# Patient Record
Sex: MALE | Race: WHITE | NOT HISPANIC OR LATINO | URBAN - METROPOLITAN AREA
[De-identification: names, ages, dates, MRNs, and addresses within clinical notes are randomized per-mention and may not be internally consistent; named-entity substitution may affect disease eponyms.]

---

## 2018-03-23 ENCOUNTER — INPATIENT (INPATIENT)
Facility: HOSPITAL | Age: 83
LOS: 3 days | Discharge: HOME HEALTH | End: 2018-03-27
Attending: INTERNAL MEDICINE

## 2018-03-23 VITALS
DIASTOLIC BLOOD PRESSURE: 78 MMHG | SYSTOLIC BLOOD PRESSURE: 154 MMHG | OXYGEN SATURATION: 98 % | HEIGHT: 69 IN | RESPIRATION RATE: 17 BRPM | HEART RATE: 78 BPM | WEIGHT: 179.9 LBS | TEMPERATURE: 97 F

## 2018-03-23 DIAGNOSIS — Z90.79 ACQUIRED ABSENCE OF OTHER GENITAL ORGAN(S): Chronic | ICD-10-CM

## 2018-03-23 LAB
ALBUMIN SERPL ELPH-MCNC: 5.1 G/DL — SIGNIFICANT CHANGE UP (ref 3.5–5.2)
ALP SERPL-CCNC: 70 U/L — SIGNIFICANT CHANGE UP (ref 30–115)
ALT FLD-CCNC: 18 U/L — SIGNIFICANT CHANGE UP (ref 0–41)
ANION GAP SERPL CALC-SCNC: 16 MMOL/L — HIGH (ref 7–14)
AST SERPL-CCNC: 22 U/L — SIGNIFICANT CHANGE UP (ref 0–41)
BILIRUB SERPL-MCNC: 0.9 MG/DL — SIGNIFICANT CHANGE UP (ref 0.2–1.2)
BUN SERPL-MCNC: 45 MG/DL — HIGH (ref 10–20)
CALCIUM SERPL-MCNC: 10.1 MG/DL — SIGNIFICANT CHANGE UP (ref 8.5–10.1)
CHLORIDE SERPL-SCNC: 98 MMOL/L — SIGNIFICANT CHANGE UP (ref 98–110)
CO2 SERPL-SCNC: 31 MMOL/L — SIGNIFICANT CHANGE UP (ref 17–32)
CREAT SERPL-MCNC: 1.5 MG/DL — SIGNIFICANT CHANGE UP (ref 0.7–1.5)
GLUCOSE SERPL-MCNC: 123 MG/DL — HIGH (ref 70–99)
HCT VFR BLD CALC: 49.1 % — SIGNIFICANT CHANGE UP (ref 42–52)
HGB BLD-MCNC: 16.1 G/DL — SIGNIFICANT CHANGE UP (ref 14–18)
MCHC RBC-ENTMCNC: 28.5 PG — SIGNIFICANT CHANGE UP (ref 27–31)
MCHC RBC-ENTMCNC: 32.8 G/DL — SIGNIFICANT CHANGE UP (ref 32–37)
MCV RBC AUTO: 86.9 FL — SIGNIFICANT CHANGE UP (ref 80–94)
NRBC # BLD: 0 /100 WBCS — SIGNIFICANT CHANGE UP (ref 0–0)
PLATELET # BLD AUTO: 202 K/UL — SIGNIFICANT CHANGE UP (ref 130–400)
POTASSIUM SERPL-MCNC: 3.7 MMOL/L — SIGNIFICANT CHANGE UP (ref 3.5–5)
POTASSIUM SERPL-SCNC: 3.7 MMOL/L — SIGNIFICANT CHANGE UP (ref 3.5–5)
PROT SERPL-MCNC: 7.7 G/DL — SIGNIFICANT CHANGE UP (ref 6–8)
RBC # BLD: 5.65 M/UL — SIGNIFICANT CHANGE UP (ref 4.7–6.1)
RBC # FLD: 13.3 % — SIGNIFICANT CHANGE UP (ref 11.5–14.5)
SODIUM SERPL-SCNC: 145 MMOL/L — SIGNIFICANT CHANGE UP (ref 135–146)
WBC # BLD: 8.24 K/UL — SIGNIFICANT CHANGE UP (ref 4.8–10.8)
WBC # FLD AUTO: 8.24 K/UL — SIGNIFICANT CHANGE UP (ref 4.8–10.8)

## 2018-03-23 RX ORDER — SODIUM CHLORIDE 9 MG/ML
1000 INJECTION INTRAMUSCULAR; INTRAVENOUS; SUBCUTANEOUS ONCE
Qty: 0 | Refills: 0 | Status: DISCONTINUED | OUTPATIENT
Start: 2018-03-23 | End: 2018-03-27

## 2018-03-23 NOTE — ED PROVIDER NOTE - PROGRESS NOTE DETAILS
Ochoa placed, gross blood is coming out. Ochoa flushed, but clotted quickly. Called urology for 3 way ochoa. Urology will evaluate pt. Urology at bedside, placing 3-way ochoa. Recommend admission. Spoke with Dr. Mattson, notified of admission. Urology requests CT A/P and they will follow results. Dr. Mattson notified of order.

## 2018-03-23 NOTE — ED PROVIDER NOTE - CARE PLAN
Principal Discharge DX:	Urinary retention due to benign prostatic hyperplasia  Secondary Diagnosis:	Gross hematuria

## 2018-03-23 NOTE — ED PROVIDER NOTE - PHYSICAL EXAMINATION
pt in NAD, AAOx3, head NC/AT, CN II-XII intact, lungs CTA B/L, CV S1S2 regular, abdomen soft/(+) suprapubic distension and discomfort/(+)BS, ext (-) edema.

## 2018-03-23 NOTE — ED PROVIDER NOTE - OBJECTIVE STATEMENT
87 y.o. male comes in c/o urinary retention since am. No fever/chills. No CP/SOB. No abdominal pain. No back pain. Pt states he has an urge to urinate but nothing is coming out. H/o BPH. Had TURP 8 years ago. No problems since that time.

## 2018-03-23 NOTE — ED PROVIDER NOTE - NS ED ROS FT
pt in NAD, AAOx3, head NC/AT, CN II-XII intact, lungs CTA B/L, CV S1S2 regular, abdomen soft/NT/(+) suprapubic fullness/(+)BS, ext (-) edema. motor 5/5x4

## 2018-03-24 DIAGNOSIS — N40.1 BENIGN PROSTATIC HYPERPLASIA WITH LOWER URINARY TRACT SYMPTOMS: ICD-10-CM

## 2018-03-24 DIAGNOSIS — N40.0 BENIGN PROSTATIC HYPERPLASIA WITHOUT LOWER URINARY TRACT SYMPTOMS: ICD-10-CM

## 2018-03-24 DIAGNOSIS — R31.0 GROSS HEMATURIA: ICD-10-CM

## 2018-03-24 DIAGNOSIS — I10 ESSENTIAL (PRIMARY) HYPERTENSION: ICD-10-CM

## 2018-03-24 LAB
ANION GAP SERPL CALC-SCNC: 17 MMOL/L — HIGH (ref 7–14)
BUN SERPL-MCNC: 55 MG/DL — HIGH (ref 10–20)
CALCIUM SERPL-MCNC: 9.4 MG/DL — SIGNIFICANT CHANGE UP (ref 8.5–10.1)
CHLORIDE SERPL-SCNC: 100 MMOL/L — SIGNIFICANT CHANGE UP (ref 98–110)
CO2 SERPL-SCNC: 28 MMOL/L — SIGNIFICANT CHANGE UP (ref 17–32)
CREAT SERPL-MCNC: 2.1 MG/DL — HIGH (ref 0.7–1.5)
GLUCOSE SERPL-MCNC: 123 MG/DL — HIGH (ref 70–99)
HCT VFR BLD CALC: 43.1 % — SIGNIFICANT CHANGE UP (ref 42–52)
HGB BLD-MCNC: 14.2 G/DL — SIGNIFICANT CHANGE UP (ref 14–18)
INR BLD: 1.18 RATIO — SIGNIFICANT CHANGE UP (ref 0.65–1.3)
MCHC RBC-ENTMCNC: 28.3 PG — SIGNIFICANT CHANGE UP (ref 27–31)
MCHC RBC-ENTMCNC: 32.9 G/DL — SIGNIFICANT CHANGE UP (ref 32–37)
MCV RBC AUTO: 86 FL — SIGNIFICANT CHANGE UP (ref 80–94)
NRBC # BLD: 0 /100 WBCS — SIGNIFICANT CHANGE UP (ref 0–0)
PLATELET # BLD AUTO: 177 K/UL — SIGNIFICANT CHANGE UP (ref 130–400)
POTASSIUM SERPL-MCNC: 3.8 MMOL/L — SIGNIFICANT CHANGE UP (ref 3.5–5)
POTASSIUM SERPL-SCNC: 3.8 MMOL/L — SIGNIFICANT CHANGE UP (ref 3.5–5)
PROTHROM AB SERPL-ACNC: 12.8 SEC — SIGNIFICANT CHANGE UP (ref 9.95–12.87)
RBC # BLD: 5.01 M/UL — SIGNIFICANT CHANGE UP (ref 4.7–6.1)
RBC # FLD: 13.6 % — SIGNIFICANT CHANGE UP (ref 11.5–14.5)
SODIUM SERPL-SCNC: 145 MMOL/L — SIGNIFICANT CHANGE UP (ref 135–146)
WBC # BLD: 10.01 K/UL — SIGNIFICANT CHANGE UP (ref 4.8–10.8)
WBC # FLD AUTO: 10.01 K/UL — SIGNIFICANT CHANGE UP (ref 4.8–10.8)

## 2018-03-24 RX ORDER — TAMSULOSIN HYDROCHLORIDE 0.4 MG/1
0.4 CAPSULE ORAL DAILY
Qty: 0 | Refills: 0 | Status: DISCONTINUED | OUTPATIENT
Start: 2018-03-24 | End: 2018-03-24

## 2018-03-24 RX ORDER — CIPROFLOXACIN LACTATE 400MG/40ML
200 VIAL (ML) INTRAVENOUS DAILY
Qty: 0 | Refills: 0 | Status: DISCONTINUED | OUTPATIENT
Start: 2018-03-24 | End: 2018-03-27

## 2018-03-24 RX ORDER — ONDANSETRON 8 MG/1
4 TABLET, FILM COATED ORAL ONCE
Qty: 0 | Refills: 0 | Status: DISCONTINUED | OUTPATIENT
Start: 2018-03-24 | End: 2018-03-25

## 2018-03-24 RX ORDER — ATENOLOL 25 MG/1
100 TABLET ORAL DAILY
Qty: 0 | Refills: 0 | Status: DISCONTINUED | OUTPATIENT
Start: 2018-03-24 | End: 2018-03-24

## 2018-03-24 RX ORDER — MORPHINE SULFATE 50 MG/1
2 CAPSULE, EXTENDED RELEASE ORAL
Qty: 0 | Refills: 0 | Status: DISCONTINUED | OUTPATIENT
Start: 2018-03-24 | End: 2018-03-25

## 2018-03-24 RX ORDER — ATENOLOL 25 MG/1
100 TABLET ORAL DAILY
Qty: 0 | Refills: 0 | Status: DISCONTINUED | OUTPATIENT
Start: 2018-03-24 | End: 2018-03-27

## 2018-03-24 RX ORDER — SODIUM CHLORIDE 9 MG/ML
1000 INJECTION INTRAMUSCULAR; INTRAVENOUS; SUBCUTANEOUS
Qty: 0 | Refills: 0 | Status: DISCONTINUED | OUTPATIENT
Start: 2018-03-24 | End: 2018-03-27

## 2018-03-24 RX ORDER — TAMSULOSIN HYDROCHLORIDE 0.4 MG/1
0.4 CAPSULE ORAL DAILY
Qty: 0 | Refills: 0 | Status: DISCONTINUED | OUTPATIENT
Start: 2018-03-24 | End: 2018-03-27

## 2018-03-24 RX ADMIN — SODIUM CHLORIDE 75 MILLILITER(S): 9 INJECTION INTRAMUSCULAR; INTRAVENOUS; SUBCUTANEOUS at 09:27

## 2018-03-24 RX ADMIN — ATENOLOL 100 MILLIGRAM(S): 25 TABLET ORAL at 06:41

## 2018-03-24 RX ADMIN — TAMSULOSIN HYDROCHLORIDE 0.4 MILLIGRAM(S): 0.4 CAPSULE ORAL at 14:42

## 2018-03-24 NOTE — H&P ADULT - PROBLEM SELECTOR PLAN 1
for now await CAT scan report with  followup-concern about mild kidney diysfunction (may ask renal to see) for now await CAT scan report with  [id number] about mild kidney dysfunction for now await CAT scan report with  [id number] about mild kidney dysfunction (may ask renal to see)

## 2018-03-24 NOTE — CHART NOTE - NSCHARTNOTEFT_GEN_A_CORE
Dr. Sheppard asked if I could order a Urine C&S from Suprapubic catheter tonight and start Cipro due to his recent his procedure.  Patient with elevated Creat > 2 , so recommended dose is Cipro 200 mg IV Q24H.   Follow AM Creat. level.

## 2018-03-24 NOTE — BRIEF OPERATIVE NOTE - PROCEDURE
<<-----Click on this checkbox to enter Procedure Cystoscopy  03/24/2018    Active  JAMI  Suprapubic tube placement  03/24/2018    Active  JAMI

## 2018-03-24 NOTE — CONSULT NOTE ADULT - ATTENDING COMMENTS
Mr. Camargo has urinary retention  I feel given the volume and trauma to his prostate that a suprapubic tube would be the safest and most effective manner in which to decompress him  I will also be performing cystoscopy to assess the etiology of the inability to urinate  full R+B explained  all options provided

## 2018-03-24 NOTE — CONSULT NOTE ADULT - SUBJECTIVE AND OBJECTIVE BOX
86 yo male presents to the er because he hasn't voided since midnight (24 hours ago). States he only has mild pain and no fevers. ER staff attempted ochoa placement but only had blood return into ochoa tube. Subsequently a 3-way ochoa was attempted without success. Per er discussion with urology on call, a CAT scan was ordered with results pending. Patient denies history of hematuria or use of blood thinners     Review of Systems:  Other Review of Systems: All other review of systems negative, except as noted in HPI	      Allergies and Intolerances:        Allergies:  	penicillin: Drug, Short breath    Home Medications:   * Patient Currently Takes Medications as of 23-Mar-2018 22:26 documented in Structured Notes  · 	atenolol 100 mg oral tablet: 1 tab(s) orally once a day, Last Dose Taken:    · 	hydroCHLOROthiazide 50 mg oral tablet: 1 tab(s) orally once a day, Last Dose Taken:    · 	Flomax 0.4 mg oral capsule: 1 cap(s) orally once a day, Last Dose Taken:    · 	potassium chloride: Last Dose Taken:      .    Patient History:    Past Medical History:  Enlarged prostate    Hypertension.     Past Surgical History:  S/P TURP.            Physical Exam:  · Constitutional	Well-developed, well nourished	  · Eyes	detailed exam	  · Eyes Comments	?strabismus	  · ENMT	No oral lesions; no gross abnormalities	  · Neck	No bruits; no thyromegaly or nodules	  · Breasts	No masses; no nipple discharge	  · Back	No deformity or limitation of movement	  · Cardiovascular	Regular rate & rhythm, normal S1, S2; no murmurs, gallops or rubs; no S3, S4	  · Gastrointestinal	Soft, non-tender, no hepatosplenomegaly, normal bowel sounds	  · Genitourinary	not examined	  · Rectal	not examined	  · Extremities	No cyanosis, clubbing or edema	  · Vascular	Equal and normal pulses (carotid, femoral, dorsalis pedis)	  · Neurological	Alert & oriented; no sensory, motor or coordination deficits, normal reflexes	  · Skin	No lesions; no rash	  · Lymph Nodes	not examined	  · Musculoskeletal	not examined	  · Psychiatric	Affect and characteristics of appearance, verbalizations, behaviors are appropriate	       Labs and Results:  Labs, Radiology, Cardiology, and Other Results: 16.1   8.24  )-----------( 202      ( 23 Mar 2018 22:48 )             49.1    03-23   145  |  98  |  45<H>  ----------------------------<  123<H>  3.7   |  31  |  1.5   Ca    10.1      23 Mar 2018 22:48   TPro  7.7  /  Alb  5.1  /  TBili  0.9  /  DBili  x   /  AST  22  /  ALT  18  /  AlkPhos  70  03-23           Lactate Trend     CAPILLARY BLOOD GLUCOSE	    Assessment and Plan:    Problem/Plan - 1:  ·  Problem: Urinary retention due to benign prostatic hyperplasia.  FOR POSSIBLE SP TUBE THIS AM     Problem/Plan - 2:  ·  Problem: Gross hematuria.  Plan: as above, may be due to trauma.      Problem/Plan - 3:  ·  Problem: Enlarged prostate.  Plan: for now continue flomax while above issues are addressed.      Problem/Plan - 4:  ·  Problem: Hypertension, unspecified type.  Plan: continue 88 yo male presents to the er because he hasn't voided since midnight (24 hours ago). States he only has mild pain and no fevers. ER staff attempted ochoa placement but only had blood return into ochoa tube. Subsequently a 3-way ochoa was attempted without success. Patient denies history of hematuria or use of blood thinners     Review of Systems:  Other Review of Systems: All other review of systems negative, except as noted in HPI	      Allergies and Intolerances:        Allergies:  	penicillin: Drug, Short breath    Home Medications:   * Patient Currently Takes Medications as of 23-Mar-2018 22:26 documented in Structured Notes  · 	atenolol 100 mg oral tablet: 1 tab(s) orally once a day, Last Dose Taken:    · 	hydroCHLOROthiazide 50 mg oral tablet: 1 tab(s) orally once a day, Last Dose Taken:    · 	Flomax 0.4 mg oral capsule: 1 cap(s) orally once a day, Last Dose Taken:    · 	potassium chloride: Last Dose Taken:      .    Patient History:    Past Medical History:  Enlarged prostate    Hypertension.     Past Surgical History:  S/P TURP.            Physical Exam:  · Constitutional	Well-developed, well nourished	  · Eyes	detailed exam	  · Eyes Comments	?strabismus	  · ENMT	No oral lesions; no gross abnormalities	  · Neck	No bruits; no thyromegaly or nodules	  · Breasts	No masses; no nipple discharge	  · Back	No deformity or limitation of movement	  · Cardiovascular	Regular rate & rhythm, normal S1, S2; no murmurs, gallops or rubs; no S3, S4	  · Gastrointestinal	Soft, non-tender, no hepatosplenomegaly, normal bowel sounds	  · Genitourinary	circ male  no lesions 	  · Rectal	not examined	  · Extremities	No cyanosis, clubbing or edema	  · Vascular	Equal and normal pulses (carotid, femoral, dorsalis pedis)	  · Neurological	Alert & oriented; no sensory, motor or coordination deficits, normal reflexes	  · Skin	No lesions; no rash	  · Lymph Nodes	not examined	  · Musculoskeletal	not examined	  · Psychiatric	Affect and characteristics of appearance, verbalizations, behaviors are appropriate	       Labs and Results:  Labs, Radiology, Cardiology, and Other Results: 16.1   8.24  )-----------( 202      ( 23 Mar 2018 22:48 )             49.1    03-23   145  |  98  |  45<H>  ----------------------------<  123<H>  3.7   |  31  |  1.5   Ca    10.1      23 Mar 2018 22:48   TPro  7.7  /  Alb  5.1  /  TBili  0.9  /  DBili  x   /  AST  22  /  ALT  18  /  AlkPhos  70  03-23           Lactate Trend ct a&p   pending    CAPILLARY BLOOD GLUCOSE	    Assessment and Plan:    Problem/Plan - 1:  ·  Problem: Urinary retention due to benign prostatic hyperplasia.  FOR POSSIBLE SP TUBE THIS AM     Problem/Plan - 2:  ·  Problem: Gross hematuria.  Plan: as above, may be due to trauma.      Problem/Plan - 3:  ·  Problem: Enlarged prostate.  Plan: for now continue flomax while above issues are addressed.      Problem/Plan - 4:  ·  Problem: Hypertension, unspecified type.  Plan: continue

## 2018-03-24 NOTE — H&P ADULT - NSHPLABSRESULTS_GEN_ALL_CORE
16.1   8.24  )-----------( 202      ( 23 Mar 2018 22:48 )             49.1     03-23    145  |  98  |  45<H>  ----------------------------<  123<H>  3.7   |  31  |  1.5    Ca    10.1      23 Mar 2018 22:48    TPro  7.7  /  Alb  5.1  /  TBili  0.9  /  DBili  x   /  AST  22  /  ALT  18  /  AlkPhos  70  03-23              Lactate Trend        CAPILLARY BLOOD GLUCOSE

## 2018-03-24 NOTE — H&P ADULT - HISTORY OF PRESENT ILLNESS
88 yo male presents to the er because he hasn't voided since midnight (24 hours ago) ER staff attempted ochoa placement but only had blood return into ochoa tube. Subsequently a 3-way ochoa was attempted without success. Per er discussion with urology on call, a CAT scan was ordered with results pending. Patient denies history of hematuria or use of blood thinners 88 yo male presents to the er because he hasn't voided since midnight (24 hours ago). States he only has mild pain and no fevers. ER staff attempted ochoa placement but only had blood return into ochoa tube. Subsequently a 3-way ochoa was attempted without success. Per er discussion with urology on call, a CAT scan was ordered with results pending. Patient denies history of hematuria or use of blood thinners

## 2018-03-25 DIAGNOSIS — E87.6 HYPOKALEMIA: ICD-10-CM

## 2018-03-25 LAB
ANION GAP SERPL CALC-SCNC: 14 MMOL/L — SIGNIFICANT CHANGE UP (ref 7–14)
BUN SERPL-MCNC: 52 MG/DL — HIGH (ref 10–20)
CALCIUM SERPL-MCNC: 8.9 MG/DL — SIGNIFICANT CHANGE UP (ref 8.5–10.1)
CHLORIDE SERPL-SCNC: 100 MMOL/L — SIGNIFICANT CHANGE UP (ref 98–110)
CO2 SERPL-SCNC: 30 MMOL/L — SIGNIFICANT CHANGE UP (ref 17–32)
CREAT SERPL-MCNC: 1.8 MG/DL — HIGH (ref 0.7–1.5)
GLUCOSE SERPL-MCNC: 89 MG/DL — SIGNIFICANT CHANGE UP (ref 70–99)
HCT VFR BLD CALC: 39.6 % — LOW (ref 42–52)
HGB BLD-MCNC: 13.1 G/DL — LOW (ref 14–18)
INR BLD: 1.21 RATIO — SIGNIFICANT CHANGE UP (ref 0.65–1.3)
MCHC RBC-ENTMCNC: 28.9 PG — SIGNIFICANT CHANGE UP (ref 27–31)
MCHC RBC-ENTMCNC: 33.1 G/DL — SIGNIFICANT CHANGE UP (ref 32–37)
MCV RBC AUTO: 87.2 FL — SIGNIFICANT CHANGE UP (ref 80–94)
NRBC # BLD: 0 /100 WBCS — SIGNIFICANT CHANGE UP (ref 0–0)
PLATELET # BLD AUTO: 156 K/UL — SIGNIFICANT CHANGE UP (ref 130–400)
POTASSIUM SERPL-MCNC: 2.9 MMOL/L — LOW (ref 3.5–5)
POTASSIUM SERPL-SCNC: 2.9 MMOL/L — LOW (ref 3.5–5)
PROTHROM AB SERPL-ACNC: 13.2 SEC — HIGH (ref 9.95–12.87)
RBC # BLD: 4.54 M/UL — LOW (ref 4.7–6.1)
RBC # FLD: 13.8 % — SIGNIFICANT CHANGE UP (ref 11.5–14.5)
SODIUM SERPL-SCNC: 144 MMOL/L — SIGNIFICANT CHANGE UP (ref 135–146)
WBC # BLD: 8.31 K/UL — SIGNIFICANT CHANGE UP (ref 4.8–10.8)
WBC # FLD AUTO: 8.31 K/UL — SIGNIFICANT CHANGE UP (ref 4.8–10.8)

## 2018-03-25 RX ORDER — POTASSIUM CHLORIDE 20 MEQ
40 PACKET (EA) ORAL EVERY 4 HOURS
Qty: 0 | Refills: 0 | Status: COMPLETED | OUTPATIENT
Start: 2018-03-25 | End: 2018-03-25

## 2018-03-25 RX ADMIN — Medication 40 MILLIEQUIVALENT(S): at 10:48

## 2018-03-25 RX ADMIN — TAMSULOSIN HYDROCHLORIDE 0.4 MILLIGRAM(S): 0.4 CAPSULE ORAL at 11:39

## 2018-03-25 RX ADMIN — ATENOLOL 100 MILLIGRAM(S): 25 TABLET ORAL at 06:24

## 2018-03-25 RX ADMIN — Medication 100 MILLIGRAM(S): at 11:39

## 2018-03-25 NOTE — CONSULT NOTE ADULT - ASSESSMENT
87/M with GERRY , HTN, BPH admitted with urinary retention.    Chronic bladder obstruction - SPC placed, making a good amount of urine  - on Cipro    GERRY - improving  - no need for IVF. good po intake    Hypokalemia - being supplemented     f/u    will sign off, call as needed

## 2018-03-25 NOTE — CONSULT NOTE ADULT - SUBJECTIVE AND OBJECTIVE BOX
NEPHROLOGY CONSULTATION NOTE    Patient is a 87y Male whom presented to the hospital with inability to urinate, found to have urinary retention, b/l hydro. SPC placed yesterday.  Pt seen for GERRY. Good po intake, no chills, no vomiting.    PAST MEDICAL & SURGICAL HISTORY:  Enlarged prostate  Hypertension  S/P TURP    Allergies:  penicillin (Short breath)    Home Medications Reviewed  Hospital Medications:   MEDICATIONS  (STANDING):  ATENolol  Tablet 100 milliGRAM(s) Oral daily  ciprofloxacin   IVPB 200 milliGRAM(s) IV Intermittent daily  sodium chloride 0.9% Bolus 1000 milliLiter(s) IV Bolus once  sodium chloride 0.9%. 1000 milliLiter(s) (75 mL/Hr) IV Continuous <Continuous>  tamsulosin 0.4 milliGRAM(s) Oral daily      SOCIAL HISTORY:  Denies ETOH,Smoking,   FAMILY HISTORY:  No pertinent family history in first degree relatives        REVIEW OF SYSTEMS:  CONSTITUTIONAL: No weakness, fevers or chills  EYES/ENT: No visual changes;  No vertigo or throat pain   NECK: No pain or stiffness  RESPIRATORY: No cough, wheezing, hemoptysis; No shortness of breath  CARDIOVASCULAR: No chest pain or palpitations.  GASTROINTESTINAL: No abdominal or epigastric pain. No nausea, vomiting  GENITOURINARY: no suprapubic discomfort  SKIN: No itching, burning, rashes, or lesions   VASCULAR: No bilateral lower extremity edema.   All other review of systems is negative unless indicated above.    VITALS:  T(F): 97.6 (03-25-18 @ 06:00), Max: 98.7 (03-24-18 @ 09:12)  HR: 65 (03-25-18 @ 06:00)  BP: 122/59 (03-25-18 @ 06:00)  RR: 16 (03-24-18 @ 22:15)  SpO2: 92% (03-24-18 @ 10:22)    03-24 @ 07:01  -  03-25 @ 07:00  --------------------------------------------------------  IN: 855 mL / OUT: 1445 mL / NET: -590 mL          03-24-18 @ 07:01  -  03-25-18 @ 07:00  --------------------------------------------------------  IN: 0 mL / OUT: 215 mL / NET: -215 mL      I&O's Detail    24 Mar 2018 07:01  -  25 Mar 2018 07:00  --------------------------------------------------------  IN:    Oral Fluid: 520 mL    sodium chloride 0.9%.: 335 mL  Total IN: 855 mL    OUT:    Indwelling Catheter - Suprapubic: 1105 mL    Indwelling Catheter - Urethral: 215 mL    Voided: 125 mL  Total OUT: 1445 mL    Total NET: -590 mL            PHYSICAL EXAM:  Constitutional: NAD  HEENT: anicteric sclera, MMM  Neck: No JVD  Respiratory: CTAB, no wheezes, rales or rhonchi  Cardiovascular: S1, S2, RRR  Gastrointestinal: BS+, soft, NT/ND  Extremities: No cyanosis or clubbing. No peripheral edema  Neurological: A/O x 3, no focal deficits  Psychiatric: Normal mood, normal affect  : No CVA tenderness.  Gunn and SPC in place   Skin: No rashes  Vascular Access:    LABS:  03-25    144  |  100  |  52<H>  ----------------------------<  89  2.9<L>   |  30  |  1.8<H>    Ca    8.9      25 Mar 2018 06:30    TPro  7.7  /  Alb  5.1  /  TBili  0.9  /  DBili      /  AST  22  /  ALT  18  /  AlkPhos  70  03-23    Creatinine Trend: 1.8 <--, 2.1 <--, 1.5 <--                        13.1   8.31  )-----------( 156      ( 25 Mar 2018 06:30 )             39.6     Urine Studies:              RADIOLOGY & ADDITIONAL STUDIES:        < from: CT Abdomen and Pelvis No Cont (03.24.18 @ 02:00) >  KIDNEYS: There is mild bilateral hydroureteronephrosis, secondary to   chronic urinary bladder outlet obstruction.     < end of copied text >

## 2018-03-26 ENCOUNTER — TRANSCRIPTION ENCOUNTER (OUTPATIENT)
Age: 83
End: 2018-03-26

## 2018-03-26 LAB
ANION GAP SERPL CALC-SCNC: 14 MMOL/L — SIGNIFICANT CHANGE UP (ref 7–14)
BUN SERPL-MCNC: 45 MG/DL — HIGH (ref 10–20)
CALCIUM SERPL-MCNC: 8.8 MG/DL — SIGNIFICANT CHANGE UP (ref 8.5–10.1)
CHLORIDE SERPL-SCNC: 104 MMOL/L — SIGNIFICANT CHANGE UP (ref 98–110)
CO2 SERPL-SCNC: 29 MMOL/L — SIGNIFICANT CHANGE UP (ref 17–32)
CREAT SERPL-MCNC: 1.6 MG/DL — HIGH (ref 0.7–1.5)
GLUCOSE SERPL-MCNC: 89 MG/DL — SIGNIFICANT CHANGE UP (ref 70–99)
HCT VFR BLD CALC: 41.1 % — LOW (ref 42–52)
HGB BLD-MCNC: 13.3 G/DL — LOW (ref 14–18)
MAGNESIUM SERPL-MCNC: 2.1 MG/DL — SIGNIFICANT CHANGE UP (ref 1.8–2.4)
MCHC RBC-ENTMCNC: 28.4 PG — SIGNIFICANT CHANGE UP (ref 27–31)
MCHC RBC-ENTMCNC: 32.4 G/DL — SIGNIFICANT CHANGE UP (ref 32–37)
MCV RBC AUTO: 87.6 FL — SIGNIFICANT CHANGE UP (ref 80–94)
NRBC # BLD: 0 /100 WBCS — SIGNIFICANT CHANGE UP (ref 0–0)
PLATELET # BLD AUTO: 149 K/UL — SIGNIFICANT CHANGE UP (ref 130–400)
POTASSIUM SERPL-MCNC: 3.5 MMOL/L — SIGNIFICANT CHANGE UP (ref 3.5–5)
POTASSIUM SERPL-SCNC: 3.5 MMOL/L — SIGNIFICANT CHANGE UP (ref 3.5–5)
RBC # BLD: 4.69 M/UL — LOW (ref 4.7–6.1)
RBC # FLD: 13.5 % — SIGNIFICANT CHANGE UP (ref 11.5–14.5)
SODIUM SERPL-SCNC: 147 MMOL/L — HIGH (ref 135–146)
WBC # BLD: 7.83 K/UL — SIGNIFICANT CHANGE UP (ref 4.8–10.8)
WBC # FLD AUTO: 7.83 K/UL — SIGNIFICANT CHANGE UP (ref 4.8–10.8)

## 2018-03-26 RX ORDER — POTASSIUM CHLORIDE 20 MEQ
0 PACKET (EA) ORAL
Qty: 0 | Refills: 0 | COMMUNITY

## 2018-03-26 RX ORDER — MOXIFLOXACIN HYDROCHLORIDE TABLETS, 400 MG 400 MG/1
1 TABLET, FILM COATED ORAL
Qty: 20 | Refills: 0
Start: 2018-03-26 | End: 2018-04-04

## 2018-03-26 RX ADMIN — ATENOLOL 100 MILLIGRAM(S): 25 TABLET ORAL at 05:21

## 2018-03-26 RX ADMIN — TAMSULOSIN HYDROCHLORIDE 0.4 MILLIGRAM(S): 0.4 CAPSULE ORAL at 11:15

## 2018-03-26 RX ADMIN — Medication 100 MILLIGRAM(S): at 11:15

## 2018-03-26 NOTE — CHART NOTE - NSCHARTNOTEFT_GEN_A_CORE
Pt with suprapubic and ochoa catheter in place, both with pink tinged urine, no clots noted.  Case D/W Dr. Sheppard: will D/C ochoa catheter today; continue with suprapubic catheter for atleast 6 weeks, when patient will F/U in office                          13.3   7.83  )-----------( 149      ( 26 Mar 2018 07:43 )             41.1   03-26      147<H>  |  104  |  45<H>  ----------------------------<  89  3.5   |  29  |  1.6<H>    Ca    8.8      26 Mar 2018 07:43  Mg     2.1     03-26

## 2018-03-26 NOTE — DISCHARGE NOTE ADULT - PLAN OF CARE
resolution of symptoms continue meds, suprapubic catheter in place, follow up with PMD and with urology

## 2018-03-26 NOTE — CONSULT NOTE ADULT - ASSESSMENT
IMPRESSION: Rehab of 86 y/o   m  rehab  for  debility      PRECAUTIONS: [  ] Cardiac  [  ] Respiratory  [  ] Seizures [  ] Contact Isolation  [  ] Droplet Isolation  [x  ] Other fall ochoa    Weight Bearing Status:     RECOMMENDATION:    Out of Bed to Chair     DVT/Decubiti Prophylaxis    REHAB PLAN:     [ x  ] Bedside P/T 3-5 times a week   [x   ]   Bedside O/T  2-3 times a week             [  x ] No Rehab Therapy Indicated                   [   ]  Speech Therapy   Conditioning/ROM                                    ADL  Bed Mobility                                               Conditioning/ROM  Transfers                                                     Bed Mobility  Sitting /Standing Balance                         Transfers                                        Gait Training                                               Sitting/Standing Balance  Stair Training [   ]Applicable                    Home equipment Eval                                                                        Splinting  [   ] Only      GOALS:   ADL   [  x ]   Independent                    Transfers  [ x  ] Independent                          Ambulation  [x ] Independent     [  x  ] With device                            [   ]  CG                                                         [   ]  CG                                                                  [   ] CG                            [    ] Min A                                                   [   ] Min A                                                              [   ] Min  A          DISCHARGE PLAN:   [   ]  Good candidate for Intensive Rehabilitation/Hospital based-4A [facility]                                             Will tolerate 3hrs Intensive Rehab Daily                                       [   xx ]  Short Term Rehab in Skilled Nursing Facility ptn refused  however  not safe  d/c  home                                       [    ]  Home with Outpatient or VN services                                         [    ]  Possible Candidate for Intensive Hospital based Rehab

## 2018-03-26 NOTE — PHYSICAL THERAPY INITIAL EVALUATION ADULT - IMPAIRMENTS FOUND, PT EVAL
posture/ergonomics and body mechanics/aerobic capacity/endurance/gait, locomotion, and balance/muscle strength

## 2018-03-26 NOTE — DISCHARGE NOTE ADULT - CARE PROVIDER_API CALL
Anabel Sheppard), Urology  [address] [place] [address]  [address] [place] [address]  Phone: [phone]  Fax: [phone]

## 2018-03-26 NOTE — DISCHARGE NOTE ADULT - HOSPITAL COURSE
pt admitted with urine retention, s/p suprapubic catheter in place by urology, draining urine. no complications, pt has no complaints. continue current meds, follow up with PMD and with urology Dr. Sheppard.

## 2018-03-26 NOTE — DISCHARGE NOTE ADULT - PATIENT PORTAL LINK FT
You can access the [facility] Patient Portal, offered by [facility], by registering with the following website: [email or web address]

## 2018-03-26 NOTE — DISCHARGE NOTE ADULT - MEDICATION SUMMARY - MEDICATIONS TO TAKE
I will START or STAY ON the medications listed below when I get home from the hospital:    Flomax 0.4 mg oral capsule  -- 1 cap(s) by mouth once a day  -- Indication: For Enlarged prostate    atenolol 100 mg oral tablet  -- 1 tab(s) by mouth once a day  -- Indication: For Hypertension    Cipro 500 mg oral tablet  -- 1 tab(s) by mouth every 12 hours   -- Avoid prolonged or excessive exposure to direct and/or artificial sunlight while taking this medication.  Check with your doctor before becoming pregnant.  Do not take dairy products, antacids, or iron preparations within one hour of this medication.  Finish all this medication unless otherwise directed by prescriber.  Medication should be taken with plenty of water.    -- Indication: For Gross hematuria/uti

## 2018-03-26 NOTE — PHYSICAL THERAPY INITIAL EVALUATION ADULT - PLANNED THERAPY INTERVENTIONS, PT EVAL
balance training/bed mobility training/transfer training/gait training/strengthening/postural re-education

## 2018-03-26 NOTE — CONSULT NOTE ADULT - SUBJECTIVE AND OBJECTIVE BOX
HPI:  88 yo male presents to the er because he hasn't voided since midnight (24 hours ago). States he only has mild pain and no fevers. ER staff attempted ochoa placement but only had blood return into ochoa tube. Subsequently a 3-way ochoa was attempted without success. Per er discussion with urology on call, a CAT scan was ordered with results pending. Patient denies history of hematuria or use of blood thinners (24 Mar 2018 01:54)    PTN  REFERRED TO ACUTE  REHAB  FOR  EVAL AND  TX   PAST MEDICAL & SURGICAL HISTORY:  Enlarged prostate  Hypertension  S/P TURP      Hospital Course:    TODAY'S SUBJECTIVE & REVIEW OF SYMPTOMS:     Constitutional WNL   Cardio WNL   Resp WNL   GI WNL  Heme WNL  Endo WNL  Skin WNL  MSK WNL  Neuro WNL  Cognitive WNL  Psych WNL      MEDICATIONS  (STANDING):  ATENolol  Tablet 100 milliGRAM(s) Oral daily  ciprofloxacin   IVPB 200 milliGRAM(s) IV Intermittent daily  sodium chloride 0.9% Bolus 1000 milliLiter(s) IV Bolus once  sodium chloride 0.9%. 1000 milliLiter(s) (75 mL/Hr) IV Continuous <Continuous>  tamsulosin 0.4 milliGRAM(s) Oral daily    MEDICATIONS  (PRN):      FAMILY HISTORY:  No pertinent family history in first degree relatives      Allergies    penicillin (Short breath)    Intolerances        SOCIAL HISTORY:    [  ] Etoh  [  ] Smoking  [  ] Substance abuse     Home Environment:  [  ] Home Alone  [xx  ] Lives with Family  [  ] Home Health Aid    Dwelling:  [  ] Apartment  [ x ] Private House  [  ] Adult Home  [  ] Skilled Nursing Facility      [  ] Short Term  [  ] Long Term  [ x ] Stairs       Elevator [  ]    FUNCTIONAL STATUS PTA: (Check all that apply)  Ambulation: [ x  ]Independent    [  ] Dependent     [  ] Non-Ambulatory  Assistive Device: [  ] SA Cane  [  ]  Q Cane  [ x ] Walker  [  ]  Wheelchair  ADL : [ x ] Independent  [  ]  Dependent       Vital Signs Last 24 Hrs  T(C): 36.1 (26 Mar 2018 14:07), Max: 37.5 (25 Mar 2018 22:54)  T(F): 96.9 (26 Mar 2018 14:07), Max: 99.5 (25 Mar 2018 22:54)  HR: 59 (26 Mar 2018 14:07) (57 - 70)  BP: 138/75 (26 Mar 2018 14:07) (133/59 - 138/75)  BP(mean): --  RR: 16 (26 Mar 2018 14:07) (14 - 16)  SpO2: --      PHYSICAL EXAM: Alert & Oriented X3  GENERAL: NAD, well-groomed, well-developed  HEAD:  Atraumatic, Normocephalic  EYES: EOMI, PERRLA, conjunctiva and sclera clear  NECK: Supple, No JVD, Normal thyroid  CHEST/LUNG: Clear to percussion bilaterally; No rales, rhonchi, wheezing, or rubs  HEART: Regular rate and rhythm; No murmurs, rubs, or gallops  ABDOMEN: Soft, Nontender, Nondistended; Bowel sounds present  EXTREMITIES:  2+ Peripheral Pulses, No clubbing, cyanosis, or edema    NERVOUS SYSTEM:  Cranial Nerves 2-12 intact [ x ] Abnormal  [  ]  ROM: WFL all extremities [ x ]  Abnormal [  ]  Motor Strength: WFL all extremities  [x  ]  Abnormal [  ]  Sensation: intact to light touch [x ] Abnormal [  ]  Reflexes: Symmetric [ x ]  Abnormal [  ]    FUNCTIONAL STATUS:  Bed Mobility: Independent [  ]  Supervision [  ]  Needs Assistance [ x]  N/A [  ]  Transfers: Independent [  ]  Supervision [  ]  Needs Assistance [ x ]  N/A [  ]   Ambulation: Independent [  ]  Supervision [  ]  Needs Assistance [ x ]  N/A [  ]  ADL: Independent [ x ] Requires Assistance [  ] N/A [  ]      LABS:                        13.3   7.83  )-----------( 149      ( 26 Mar 2018 07:43 )             41.1     03-26    147<H>  |  104  |  45<H>  ----------------------------<  89  3.5   |  29  |  1.6<H>    Ca    8.8      26 Mar 2018 07:43  Mg     2.1     03-26      PT/INR - ( 25 Mar 2018 06:30 )   PT: 13.20 sec;   INR: 1.21 ratio               RADIOLOGY & ADDITIONAL STUDIES:    Assesment:

## 2018-03-26 NOTE — PHYSICAL THERAPY INITIAL EVALUATION ADULT - GENERAL OBSERVATIONS, REHAB EVAL
14:55 - 15:13.  Chart reviewed. Patient available to be seen for physical therapy, confirmed with nurse. Patient encountered semi-reclined in bed. Denies pain at rest and is agreeable for therapy.

## 2018-03-26 NOTE — DISCHARGE NOTE ADULT - MEDICATION SUMMARY - MEDICATIONS TO STOP TAKING
I will STOP taking the medications listed below when I get home from the hospital:    hydroCHLOROthiazide 50 mg oral tablet  -- 1 tab(s) by mouth once a day

## 2018-03-26 NOTE — DISCHARGE NOTE ADULT - CARE PLAN
Principal Discharge DX:	Urinary retention due to benign prostatic hyperplasia  Goal:	resolution of symptoms  Assessment and plan of treatment:	continue meds, suprapubic catheter in place, follow up with PMD and with urology

## 2018-03-27 VITALS
RESPIRATION RATE: 16 BRPM | DIASTOLIC BLOOD PRESSURE: 89 MMHG | TEMPERATURE: 98 F | HEART RATE: 62 BPM | SYSTOLIC BLOOD PRESSURE: 119 MMHG

## 2018-03-27 RX ADMIN — ATENOLOL 100 MILLIGRAM(S): 25 TABLET ORAL at 06:22

## 2018-03-27 RX ADMIN — TAMSULOSIN HYDROCHLORIDE 0.4 MILLIGRAM(S): 0.4 CAPSULE ORAL at 13:12

## 2018-03-27 RX ADMIN — Medication 100 MILLIGRAM(S): at 13:12

## 2018-03-27 NOTE — PROGRESS NOTE ADULT - PROBLEM SELECTOR PLAN 2
improved  draining pink/clear urine
improved  draining pink/clear urine
may be secondary to ochoa insertion  BPH

## 2018-03-27 NOTE — PROGRESS NOTE ADULT - PROBLEM SELECTOR PROBLEM 1
Urinary retention due to benign prostatic hyperplasia

## 2018-03-27 NOTE — PROGRESS NOTE ADULT - ASSESSMENT
Hypokalemia: Hypokalemia  Hypertension, unspecified type: Hypertension, unspecified type  Enlarged prostate: Enlarged prostate  Gross hematuria: Gross hematuria  Urinary retention due to benign prostatic hyperplasia: Urinary retention due to benign prostatic hyperplasia
Hypertension, unspecified type: Hypertension, unspecified type  Enlarged prostate: Enlarged prostate  Gross hematuria: Gross hematuria  Urinary retention due to benign prostatic hyperplasia: Urinary retention due to benign prostatic hyperplasia
Hypokalemia: Hypokalemia  Hypertension, unspecified type: Hypertension, unspecified type  Enlarged prostate: Enlarged prostate  Gross hematuria: Gross hematuria  Urinary retention due to benign prostatic hyperplasia: Urinary retention due to benign prostatic hyperplasia
Impression:  s/p Placement of suprapubic catheter and Villa catheter due to urinary retention from BPH (POD #1).
Impression:  s/p Placement of suprapubic catheter for BPH with urinary retention today.

## 2018-03-27 NOTE — PROGRESS NOTE ADULT - SUBJECTIVE AND OBJECTIVE BOX
JERRI DORSEYMALCOLM  87y  Male      Patient is a 87y old  Male who presents with a chief complaint of urinary retention (24 Mar 2018 01:54)      INTERVAL HPI/OVERNIGHT EVENTS:  none          T(C): 35.8 (03-26-18 @ 05:33), Max: 37.5 (03-25-18 @ 22:54)  HR: 57 (03-26-18 @ 05:33) (57 - 70)  BP: 133/59 (03-26-18 @ 05:33) (133/59 - 180/72)  RR: 14 (03-26-18 @ 05:33) (14 - 16)  SpO2: --  Wt(kg): --  Vital Signs Last 24 Hrs  T(C): 35.8 (26 Mar 2018 05:33), Max: 37.5 (25 Mar 2018 22:54)  T(F): 96.5 (26 Mar 2018 05:33), Max: 99.5 (25 Mar 2018 22:54)  HR: 57 (26 Mar 2018 05:33) (57 - 70)  BP: 133/59 (26 Mar 2018 05:33) (133/59 - 180/72)  BP(mean): --  RR: 14 (26 Mar 2018 05:33) (14 - 16)  SpO2: --    PHYSICAL EXAM:  GENERAL: NAD   HEAD:  Atraumatic, Normocephalic  CHEST/LUNG: Clear to auscultation   HEART: S1/S2  ABDOMEN: Soft, suprapubic catheter in place, draining urine  EXTREMITIES:  no edema    LABS:                          13.3   7.83  )-----------( 149      ( 26 Mar 2018 07:43 )             41.1     03-25    144  |  100  |  52<H>  ----------------------------<  89  2.9<L>   |  30  |  1.8<H>    Ca    8.9      25 Mar 2018 06:30          MEDICATIONS  (STANDING):  ATENolol  Tablet 100 milliGRAM(s) Oral daily  ciprofloxacin   IVPB 200 milliGRAM(s) IV Intermittent daily  sodium chloride 0.9% Bolus 1000 milliLiter(s) IV Bolus once  sodium chloride 0.9%. 1000 milliLiter(s) (75 mL/Hr) IV Continuous <Continuous>  tamsulosin 0.4 milliGRAM(s) Oral daily    MEDICATIONS  (PRN):        Consultant(s) Notes Reviewed:  [x ] YES  [ ] NO  Care Discussed with Consultants/Other Providers [ x] YES  [ ] NO        RADIOLOGY & ADDITIONAL TESTS:      Imaging Personally Reviewed:  [ ] YES  [ ] NO    HEALTH ISSUES - PROBLEM Dx:  Hypokalemia: Hypokalemia  Hypertension, unspecified type: Hypertension, unspecified type  Enlarged prostate: Enlarged prostate  Gross hematuria: Gross hematuria  Urinary retention due to benign prostatic hyperplasia: Urinary retention due to benign prostatic hyperplasia          Case discussed with housestaff
DORSEY JERRIMALCOLM  87y  Male      Patient is a 87y old  Male who presents with a chief complaint of urinary retention (24 Mar 2018 01:54)      INTERVAL HPI/OVERNIGHT EVENTS:  s/p suprapubic cath placed by urology        T(C): 36.4 (03-25-18 @ 06:00), Max: 37.1 (03-24-18 @ 09:12)  HR: 65 (03-25-18 @ 06:00) (55 - 68)  BP: 122/59 (03-25-18 @ 06:00) (102/53 - 130/62)  RR: 16 (03-24-18 @ 22:15) (16 - 18)  SpO2: 92% (03-24-18 @ 10:22) (92% - 97%)  Wt(kg): --  Vital Signs Last 24 Hrs  T(C): 36.4 (25 Mar 2018 06:00), Max: 37.1 (24 Mar 2018 09:12)  T(F): 97.6 (25 Mar 2018 06:00), Max: 98.7 (24 Mar 2018 09:12)  HR: 65 (25 Mar 2018 06:00) (55 - 68)  BP: 122/59 (25 Mar 2018 06:00) (102/53 - 130/62)  BP(mean): --  RR: 16 (24 Mar 2018 22:15) (16 - 18)  SpO2: 92% (24 Mar 2018 10:22) (92% - 97%)    PHYSICAL EXAM:  GENERAL: NAD   HEAD:  Atraumatic, Normocephalic  CHEST/LUNG: Clear to auscultation bilaterally  HEART: S1/S2  ABDOMEN: Suprapubic catheter  EXTREMITIES:  no edema    LABS:                          13.1   8.31  )-----------( 156      ( 25 Mar 2018 06:30 )             39.6     03-25    144  |  100  |  52<H>  ----------------------------<  89  2.9<L>   |  30  |  1.8<H>    Ca    8.9      25 Mar 2018 06:30    TPro  7.7  /  Alb  5.1  /  TBili  0.9  /  DBili  x   /  AST  22  /  ALT  18  /  AlkPhos  70  03-23        MEDICATIONS  (STANDING):  ATENolol  Tablet 100 milliGRAM(s) Oral daily  ciprofloxacin   IVPB 200 milliGRAM(s) IV Intermittent daily  sodium chloride 0.9% Bolus 1000 milliLiter(s) IV Bolus once  sodium chloride 0.9%. 1000 milliLiter(s) (75 mL/Hr) IV Continuous <Continuous>  tamsulosin 0.4 milliGRAM(s) Oral daily    MEDICATIONS  (PRN):  morphine  - Injectable 2 milliGRAM(s) IV Push every 15 minutes PRN Severe Pain (7 - 10)  ondansetron Injectable 4 milliGRAM(s) IV Push once PRN Nausea and/or Vomiting        Consultant(s) Notes Reviewed:  [x ] YES  [ ] NO  Care Discussed with Consultants/Other Providers [ x] YES  [ ] NO        RADIOLOGY & ADDITIONAL TESTS:      Imaging Personally Reviewed:  [ ] YES  [ ] NO    HEALTH ISSUES - PROBLEM Dx:  Hypertension, unspecified type: Hypertension, unspecified type  Enlarged prostate: Enlarged prostate  Gross hematuria: Gross hematuria  Urinary retention due to benign prostatic hyperplasia: Urinary retention due to benign prostatic hyperplasia          Case discussed with housestaff
DORSEY JERRIMALCOLM  87y  Male      Patient is a 87y old  Male who presents with a chief complaint of urinary retention (26 Mar 2018 14:13)      INTERVAL HPI/OVERNIGHT EVENTS:  ochoa catheter discontinued        T(C): 36.4 (03-27-18 @ 06:00), Max: 36.4 (03-27-18 @ 06:00)  HR: 62 (03-27-18 @ 06:00) (59 - 62)  BP: 154/70 (03-27-18 @ 06:00) (119/55 - 154/70)  RR: 16 (03-27-18 @ 06:00) (16 - 16)  SpO2: --  Wt(kg): --  Vital Signs Last 24 Hrs  T(C): 36.4 (27 Mar 2018 06:00), Max: 36.4 (27 Mar 2018 06:00)  T(F): 97.5 (27 Mar 2018 06:00), Max: 97.5 (27 Mar 2018 06:00)  HR: 62 (27 Mar 2018 06:00) (59 - 62)  BP: 154/70 (27 Mar 2018 06:00) (119/55 - 154/70)  BP(mean): --  RR: 16 (27 Mar 2018 06:00) (16 - 16)  SpO2: --    PHYSICAL EXAM:  GENERAL: NAD   HEAD:  Atraumatic, Normocephalic  CHEST/LUNG: Clear to auscultation bilaterally  HEART: S1/S2  ABDOMEN: Soft, Nontender, suprapubic catheter   EXTREMITIES:  no edema,     LABS:                          13.3   7.83  )-----------( 149      ( 26 Mar 2018 07:43 )             41.1     03-26    147<H>  |  104  |  45<H>  ----------------------------<  89  3.5   |  29  |  1.6<H>    Ca    8.8      26 Mar 2018 07:43  Mg     2.1     03-26          MEDICATIONS  (STANDING):  ATENolol  Tablet 100 milliGRAM(s) Oral daily  ciprofloxacin   IVPB 200 milliGRAM(s) IV Intermittent daily  sodium chloride 0.9% Bolus 1000 milliLiter(s) IV Bolus once  sodium chloride 0.9%. 1000 milliLiter(s) (75 mL/Hr) IV Continuous <Continuous>  tamsulosin 0.4 milliGRAM(s) Oral daily    MEDICATIONS  (PRN):        Consultant(s) Notes Reviewed:  [x ] YES  [ ] NO  Care Discussed with Consultants/Other Providers [ x] YES  [ ] NO        RADIOLOGY & ADDITIONAL TESTS:      Imaging Personally Reviewed:  [ ] YES  [ ] NO    HEALTH ISSUES - PROBLEM Dx:  Hypokalemia: Hypokalemia  Hypertension, unspecified type: Hypertension, unspecified type  Enlarged prostate: Enlarged prostate  Gross hematuria: Gross hematuria  Urinary retention due to benign prostatic hyperplasia: Urinary retention due to benign prostatic hyperplasia          Case discussed with housestaff
POD #1 -- s/p Placement of suprapubic catheter and Ochoa catheter due to urinary retention from BPH.       Patient seen and examined and feels well.  Denies pain.  SP catheter and Ochoa catheter functioning well and urine is clearing and is very light pink.        Vital Signs Last 24 Hrs  T(C): 36.1 (25 Mar 2018 14:20), Max: 36.5 (24 Mar 2018 22:15)  T(F): 97 (25 Mar 2018 14:20), Max: 97.7 (24 Mar 2018 22:15)  HR: 66 (25 Mar 2018 14:20) (55 - 66)  BP: 180/72 (25 Mar 2018 14:20) (122/59 - 180/72)  BP(mean): --  RR: 16 (25 Mar 2018 14:20) (16 - 16)        Exam:   Abdomen:  SP cath. in place and functioning well with clearing hematuria, dressing is C/D/I, no bleeding,  no erythema, no tenderness,  no distention,  no peritoneal signs.  :  ochoa cath. in place and functioning well with clearing hematuria.          Labs:                        13.1   8.31  )-----------( 156      ( 25 Mar 2018 06:30 )             39.6         03-25    144  |  100  |  52<H>  ----------------------------<  89  2.9<L>   |  30  |  1.8<H>    Ca    8.9      25 Mar 2018 06:30    TPro  7.7  /  Alb  5.1  /  TBili  0.9  /  DBili  x   /  AST  22  /  ALT  18  /  AlkPhos  70  03-23
Post-op. note:  s/p Placement of suprapubic catheter for BPH with urinary retention today.      Patient seen and examined.    Reports mild incisional pain over suprapubic area, otherwise feels well.  Denies CP, SOB, dizziness, N/V.  Suprapubic catheter and Villa catheter in place with hematuria noted, no clots, good output.         Vital Signs Last 24 Hrs  T(C): 36.8 (24 Mar 2018 10:22), Max: 37.1 (24 Mar 2018 09:12)  T(F): 98.3 (24 Mar 2018 10:22), Max: 98.7 (24 Mar 2018 09:12)  HR: 56 (24 Mar 2018 10:22) (56 - 97)  BP: 102/58 (24 Mar 2018 10:22) (102/53 - 163/73)  BP(mean): --  RR: 16 (24 Mar 2018 09:52) (16 - 18)  SpO2: 92% (24 Mar 2018 10:22) (92% - 98%)          PHYSICAL EXAM:    General: Conversant in NAD.    Eyes: PERRLA, EOM intact.    Neck: no tenderness, no JVD.    Back: no spinal tenderness.     Respiratory: CTA B/L.    Cardiovascular:  S1 & S2, RRR.    Abdomen: + BS, soft, no distention, dressing over suprapubic area is C/D/I, no bleeding, mild incisional tenderness, no rebound or guarding or peritoneal signs.    :  Villa catheter in place, hematuria noted but no clots.    Extremities: Free painless ROM, no C/C/E, no calf tenderness.     Neurological: No focal deficits.           Labs:                            14.2   10.01 )-----------( 177      ( 24 Mar 2018 06:37 )             43.1       03-24    145  |  100  |  55<H>  ----------------------------<  123<H>  3.8   |  28  |  2.1<H>    Ca    9.4      24 Mar 2018 06:30    TPro  7.7  /  Alb  5.1  /  TBili  0.9  /  DBili  x   /  AST  22  /  ALT  18  /  AlkPhos  70  03-23
RN informs me that plan is for placement of a suprapubic catheter this am due to prostate induced obstruction
no results yet on CAT scan so I called radiology file room for stat reading. Meanwhile patient says he is comfortable and is able to sleep

## 2018-03-29 DIAGNOSIS — N40.1 BENIGN PROSTATIC HYPERPLASIA WITH LOWER URINARY TRACT SYMPTOMS: ICD-10-CM

## 2018-03-29 DIAGNOSIS — R31.0 GROSS HEMATURIA: ICD-10-CM

## 2018-03-29 DIAGNOSIS — N17.9 ACUTE KIDNEY FAILURE, UNSPECIFIED: ICD-10-CM

## 2018-03-29 DIAGNOSIS — R33.8 OTHER RETENTION OF URINE: ICD-10-CM

## 2018-03-29 DIAGNOSIS — N13.30 UNSPECIFIED HYDRONEPHROSIS: ICD-10-CM

## 2018-03-29 DIAGNOSIS — E87.6 HYPOKALEMIA: ICD-10-CM

## 2018-03-29 DIAGNOSIS — I10 ESSENTIAL (PRIMARY) HYPERTENSION: ICD-10-CM

## 2018-04-05 ENCOUNTER — APPOINTMENT (OUTPATIENT)
Dept: UROLOGY | Facility: CLINIC | Age: 83
End: 2018-04-05
Payer: MEDICARE

## 2018-04-05 DIAGNOSIS — Z78.9 OTHER SPECIFIED HEALTH STATUS: ICD-10-CM

## 2018-04-05 DIAGNOSIS — Z80.0 FAMILY HISTORY OF MALIGNANT NEOPLASM OF DIGESTIVE ORGANS: ICD-10-CM

## 2018-04-05 DIAGNOSIS — E87.6 HYPOKALEMIA: ICD-10-CM

## 2018-04-05 DIAGNOSIS — I10 ESSENTIAL (PRIMARY) HYPERTENSION: ICD-10-CM

## 2018-04-05 DIAGNOSIS — R33.8 OTHER RETENTION OF URINE: ICD-10-CM

## 2018-04-05 PROBLEM — Z00.00 ENCOUNTER FOR PREVENTIVE HEALTH EXAMINATION: Status: ACTIVE | Noted: 2018-04-05

## 2018-04-05 PROBLEM — N40.0 BENIGN PROSTATIC HYPERPLASIA WITHOUT LOWER URINARY TRACT SYMPTOMS: Chronic | Status: ACTIVE | Noted: 2018-03-23

## 2018-04-05 PROCEDURE — 51700 IRRIGATION OF BLADDER: CPT

## 2018-04-05 RX ORDER — TAMSULOSIN HYDROCHLORIDE 0.4 MG/1
0.4 CAPSULE ORAL
Qty: 90 | Refills: 0 | Status: ACTIVE | COMMUNITY
Start: 2018-01-02

## 2018-04-05 RX ORDER — ATENOLOL 100 MG/1
100 TABLET ORAL
Qty: 90 | Refills: 0 | Status: ACTIVE | COMMUNITY
Start: 2017-12-26

## 2018-04-05 RX ORDER — CIPROFLOXACIN HYDROCHLORIDE 500 MG/1
500 TABLET, FILM COATED ORAL
Qty: 20 | Refills: 0 | Status: ACTIVE | COMMUNITY
Start: 2018-03-26

## 2018-04-05 RX ORDER — HYDROCHLOROTHIAZIDE 50 MG/1
50 TABLET ORAL
Qty: 90 | Refills: 0 | Status: ACTIVE | COMMUNITY
Start: 2017-11-12

## 2018-04-05 RX ORDER — POTASSIUM CHLORIDE 750 MG/1
10 TABLET, EXTENDED RELEASE ORAL
Qty: 90 | Refills: 0 | Status: ACTIVE | COMMUNITY
Start: 2017-11-12

## 2018-04-05 NOTE — HISTORY OF PRESENT ILLNESS
[Urinary Retention] : urinary retention [FreeTextEntry1] : Mr. Camargo had a suprapubic tube placed by Dr. Sheppard on March 24, 2018 he was discharged home on March 27 and the call today saying that the suprapubic tube stopped draining. His son told me that there have been occasional clots it since early this morning there has been nothing draining from the tube. He was told to come in.

## 2018-04-05 NOTE — LETTER BODY
[Dear  ___] : Dear  [unfilled], [Courtesy Letter:] : I had the pleasure of seeing your patient, [unfilled], in my office today. [Please see my note below.] : Please see my note below. [Sincerely,] : Sincerely, [FreeTextEntry2] : Uri Dolan MD\par [address] [facility]\[place] [address]

## 2018-04-05 NOTE — REVIEW OF SYSTEMS
[Feeling Poorly] : feeling poorly [Feeling Tired] : feeling tired [see HPI] : see HPI [Dizziness] : dizziness [Negative] : Psychiatric

## 2018-04-05 NOTE — ASSESSMENT
[FreeTextEntry1] : The bag has a fair amount of clot within it. When I disconnected the tube from the bank and a clean fashion there was no drainage. It was irrigated out and after about 150 mL of sterile water the clots stopped coming and then it started draining. I drained about three 400 mL of cloudy darkish red urine it looked like old blood. We then began to irrigate him out and after one and a half liters of irrigation the return was clear.\par \par I discussed with him and his son that for now looks okay. He could easily start the bleeding clot off-again those these looked more like old rather than fresh clots. We’re here tomorrow and then will be close to the weekend. \par

## 2018-04-05 NOTE — PHYSICAL EXAM
[Abdomen Soft] : soft [Abdomen Tenderness] : non-tender [Costovertebral Angle Tenderness] : no ~M costovertebral angle tenderness [Testes Tenderness] : no tenderness of the testes [Testes Mass (___cm)] : there were no testicular masses [] : no respiratory distress [Respiration, Rhythm And Depth] : normal respiratory rhythm and effort [Exaggerated Use Of Accessory Muscles For Inspiration] : no accessory muscle use [Oriented To Time, Place, And Person] : oriented to person, place, and time [Affect] : the affect was normal [Mood] : the mood was normal [Not Anxious] : not anxious [FreeTextEntry1] : difficulty walking

## 2018-04-05 NOTE — LETTER HEADER
[Pasquale Silveira MD] : Pasquale Silveira MD [Director of Urology] : Director of Urology [[address]] : [address] [[address]] : [address] [[place] [address]] : [place] [address] [Tel [phone]] : Tel [phone] [Fax [phone]] : Fax [phone]

## 2018-04-05 NOTE — END OF VISIT
[>50% of Time Spent on Counseling for ____] : Greater than 50% of the encounter time was spent on counseling for [unfilled]

## 2018-04-12 ENCOUNTER — APPOINTMENT (OUTPATIENT)
Dept: UROLOGY | Facility: CLINIC | Age: 83
End: 2018-04-12
Payer: MEDICARE

## 2018-04-12 VITALS — SYSTOLIC BLOOD PRESSURE: 155 MMHG | HEART RATE: 56 BPM | DIASTOLIC BLOOD PRESSURE: 64 MMHG

## 2018-04-12 PROCEDURE — 99213 OFFICE O/P EST LOW 20 MIN: CPT

## 2018-04-12 NOTE — ASSESSMENT
[FreeTextEntry1] : 88 yo with retention requiring SPT plct secondary to BPH\par \par - change spt in 1 month\par - start finasteride\par - will cap spt and give pt voiding trial in 1 month

## 2018-04-12 NOTE — PHYSICAL EXAM
[General Appearance - Well Developed] : well developed [General Appearance - Well Nourished] : well nourished [Normal Appearance] : normal appearance [Well Groomed] : well groomed [General Appearance - In No Acute Distress] : no acute distress [Abdomen Soft] : soft [Abdomen Tenderness] : non-tender [Costovertebral Angle Tenderness] : no ~M costovertebral angle tenderness [Edema] : no peripheral edema [] : no respiratory distress [Respiration, Rhythm And Depth] : normal respiratory rhythm and effort [Exaggerated Use Of Accessory Muscles For Inspiration] : no accessory muscle use [Oriented To Time, Place, And Person] : oriented to person, place, and time [Affect] : the affect was normal [Mood] : the mood was normal [Not Anxious] : not anxious [Normal Station and Gait] : the gait and station were normal for the patient's age [No Focal Deficits] : no focal deficits

## 2018-04-12 NOTE — HISTORY OF PRESENT ILLNESS
[None] : None [FreeTextEntry1] : OCTAVIO DORSEY is a 87 year old male with a past medical history of urinary retention. Presents to the office today for a follow up, last seen on 4/5/2018 by Dr. Silveira. Patient had  suprapubic tube placed on 3/24/2018 by Dr. Sheppard and patient was seen on 4/5/2018 for irrigation of suprapubic catheter due to no draining from clots. Patient currently has issues with suprapubic catheter at this time, draining clear yellow urine, no blood clots visible. Patient currently on Tamsulosin 0.4 mg daily. Son assisting patient with suprapubic catheter care as needed. No redness, drainage around suprapubic. No fever or chills. \par  [Fever] : no fever

## 2018-04-12 NOTE — LETTER BODY
[Dear  ___] : Dear  [unfilled], [Courtesy Letter:] : I had the pleasure of seeing your patient, [unfilled], in my office today. [Please see my note below.] : Please see my note below. [Sincerely,] : Sincerely, [FreeTextEntry2] : Dr. Uri Mejia

## 2018-04-12 NOTE — LETTER HEADER
[Ismael Sheppard MD] : Ismael Sheppard MD [Director of Urologic Oncology] : Director of Urologic Oncology [Cancer Services] : Cancer Services [Tel [phone]] : Tel [phone] [Fax [phone]] : Fax [phone]

## 2018-05-24 ENCOUNTER — APPOINTMENT (OUTPATIENT)
Dept: UROLOGY | Facility: CLINIC | Age: 83
End: 2018-05-24
Payer: MEDICARE

## 2018-05-24 VITALS
DIASTOLIC BLOOD PRESSURE: 84 MMHG | BODY MASS INDEX: 21.47 KG/M2 | SYSTOLIC BLOOD PRESSURE: 190 MMHG | WEIGHT: 150 LBS | HEIGHT: 70 IN | HEART RATE: 63 BPM

## 2018-05-24 PROCEDURE — 51705 CHANGE OF BLADDER TUBE: CPT

## 2018-07-05 ENCOUNTER — APPOINTMENT (OUTPATIENT)
Dept: UROLOGY | Facility: CLINIC | Age: 83
End: 2018-07-05
Payer: MEDICARE

## 2018-07-05 VITALS
WEIGHT: 150 LBS | HEART RATE: 60 BPM | BODY MASS INDEX: 21.47 KG/M2 | DIASTOLIC BLOOD PRESSURE: 69 MMHG | HEIGHT: 70 IN | SYSTOLIC BLOOD PRESSURE: 176 MMHG

## 2018-07-05 PROCEDURE — 51705 CHANGE OF BLADDER TUBE: CPT

## 2018-08-13 ENCOUNTER — APPOINTMENT (OUTPATIENT)
Dept: UROLOGY | Facility: CLINIC | Age: 83
End: 2018-08-13

## 2018-08-27 ENCOUNTER — APPOINTMENT (OUTPATIENT)
Dept: UROLOGY | Facility: CLINIC | Age: 83
End: 2018-08-27
Payer: MEDICARE

## 2018-08-27 VITALS
HEIGHT: 70 IN | BODY MASS INDEX: 21.47 KG/M2 | HEART RATE: 58 BPM | DIASTOLIC BLOOD PRESSURE: 83 MMHG | WEIGHT: 150 LBS | SYSTOLIC BLOOD PRESSURE: 191 MMHG

## 2018-08-27 PROCEDURE — 51705 CHANGE OF BLADDER TUBE: CPT

## 2018-09-06 ENCOUNTER — TRANSCRIPTION ENCOUNTER (OUTPATIENT)
Age: 83
End: 2018-09-06

## 2018-10-04 ENCOUNTER — APPOINTMENT (OUTPATIENT)
Dept: UROLOGY | Facility: CLINIC | Age: 83
End: 2018-10-04
Payer: MEDICARE

## 2018-10-04 VITALS
BODY MASS INDEX: 21.47 KG/M2 | HEIGHT: 70 IN | HEART RATE: 60 BPM | DIASTOLIC BLOOD PRESSURE: 77 MMHG | SYSTOLIC BLOOD PRESSURE: 189 MMHG | WEIGHT: 150 LBS

## 2018-10-04 PROCEDURE — 51705 CHANGE OF BLADDER TUBE: CPT

## 2018-11-12 ENCOUNTER — APPOINTMENT (OUTPATIENT)
Dept: UROLOGY | Facility: CLINIC | Age: 83
End: 2018-11-12
Payer: MEDICARE

## 2018-11-12 VITALS
WEIGHT: 142 LBS | SYSTOLIC BLOOD PRESSURE: 192 MMHG | DIASTOLIC BLOOD PRESSURE: 73 MMHG | BODY MASS INDEX: 20.33 KG/M2 | HEART RATE: 61 BPM | HEIGHT: 70 IN

## 2018-11-12 PROCEDURE — 51705 CHANGE OF BLADDER TUBE: CPT

## 2018-12-10 ENCOUNTER — APPOINTMENT (OUTPATIENT)
Dept: UROLOGY | Facility: CLINIC | Age: 83
End: 2018-12-10
Payer: MEDICARE

## 2018-12-10 VITALS
HEART RATE: 71 BPM | BODY MASS INDEX: 20.33 KG/M2 | WEIGHT: 142 LBS | HEIGHT: 70 IN | DIASTOLIC BLOOD PRESSURE: 73 MMHG | SYSTOLIC BLOOD PRESSURE: 204 MMHG

## 2018-12-10 PROCEDURE — 51705 CHANGE OF BLADDER TUBE: CPT

## 2019-01-14 ENCOUNTER — APPOINTMENT (OUTPATIENT)
Dept: UROLOGY | Facility: CLINIC | Age: 84
End: 2019-01-14
Payer: MEDICARE

## 2019-01-14 VITALS
SYSTOLIC BLOOD PRESSURE: 213 MMHG | HEIGHT: 70 IN | HEART RATE: 57 BPM | BODY MASS INDEX: 20.47 KG/M2 | WEIGHT: 143 LBS | DIASTOLIC BLOOD PRESSURE: 70 MMHG

## 2019-01-14 DIAGNOSIS — N40.1 BENIGN PROSTATIC HYPERPLASIA WITH LOWER URINARY TRACT SYMPMS: ICD-10-CM

## 2019-01-14 DIAGNOSIS — N40.0 BENIGN PROSTATIC HYPERPLASIA WITHOUT LOWER URINARY TRACT SYMPMS: ICD-10-CM

## 2019-01-14 DIAGNOSIS — N13.8 BENIGN PROSTATIC HYPERPLASIA WITH LOWER URINARY TRACT SYMPMS: ICD-10-CM

## 2019-01-14 PROCEDURE — 51705 CHANGE OF BLADDER TUBE: CPT

## 2019-02-14 ENCOUNTER — APPOINTMENT (OUTPATIENT)
Dept: UROLOGY | Facility: CLINIC | Age: 84
End: 2019-02-14
Payer: MEDICARE

## 2019-02-14 PROCEDURE — 51705 CHANGE OF BLADDER TUBE: CPT

## 2019-03-15 ENCOUNTER — TRANSCRIPTION ENCOUNTER (OUTPATIENT)
Age: 84
End: 2019-03-15

## 2019-03-21 ENCOUNTER — APPOINTMENT (OUTPATIENT)
Dept: UROLOGY | Facility: CLINIC | Age: 84
End: 2019-03-21
Payer: MEDICARE

## 2019-03-21 PROCEDURE — 51705 CHANGE OF BLADDER TUBE: CPT

## 2019-03-21 RX ORDER — FINASTERIDE 5 MG/1
5 TABLET, FILM COATED ORAL DAILY
Qty: 90 | Refills: 3 | Status: ACTIVE | COMMUNITY
Start: 2018-04-12 | End: 1900-01-01

## 2019-04-25 ENCOUNTER — APPOINTMENT (OUTPATIENT)
Dept: UROLOGY | Facility: CLINIC | Age: 84
End: 2019-04-25
Payer: MEDICARE

## 2019-04-25 PROCEDURE — 51705 CHANGE OF BLADDER TUBE: CPT

## 2019-05-23 ENCOUNTER — APPOINTMENT (OUTPATIENT)
Dept: UROLOGY | Facility: CLINIC | Age: 84
End: 2019-05-23
Payer: MEDICARE

## 2019-05-23 PROCEDURE — 51705 CHANGE OF BLADDER TUBE: CPT

## 2019-07-01 ENCOUNTER — APPOINTMENT (OUTPATIENT)
Dept: UROLOGY | Facility: CLINIC | Age: 84
End: 2019-07-01
Payer: MEDICARE

## 2019-07-01 PROCEDURE — 51705 CHANGE OF BLADDER TUBE: CPT

## 2019-08-05 ENCOUNTER — APPOINTMENT (OUTPATIENT)
Dept: UROLOGY | Facility: CLINIC | Age: 84
End: 2019-08-05
Payer: MEDICARE

## 2019-08-05 VITALS — WEIGHT: 143 LBS | BODY MASS INDEX: 20.47 KG/M2 | HEIGHT: 70 IN

## 2019-08-05 PROCEDURE — 51705 CHANGE OF BLADDER TUBE: CPT

## 2019-09-16 ENCOUNTER — APPOINTMENT (OUTPATIENT)
Dept: UROLOGY | Facility: CLINIC | Age: 84
End: 2019-09-16
Payer: MEDICARE

## 2019-09-16 VITALS — HEIGHT: 70 IN | WEIGHT: 143 LBS | BODY MASS INDEX: 20.47 KG/M2

## 2019-09-16 DIAGNOSIS — R33.9 RETENTION OF URINE, UNSPECIFIED: ICD-10-CM

## 2019-09-16 PROCEDURE — 51710 CHANGE OF BLADDER TUBE: CPT

## 2019-10-07 ENCOUNTER — APPOINTMENT (OUTPATIENT)
Dept: UROLOGY | Facility: CLINIC | Age: 84
End: 2019-10-07
Payer: MEDICARE

## 2019-10-07 VITALS — BODY MASS INDEX: 20.47 KG/M2 | HEIGHT: 70 IN | WEIGHT: 143 LBS

## 2019-10-07 DIAGNOSIS — Z93.59 OTHER CYSTOSTOMY STATUS: ICD-10-CM

## 2019-10-07 PROCEDURE — 51710 CHANGE OF BLADDER TUBE: CPT

## 2019-10-20 ENCOUNTER — INPATIENT (INPATIENT)
Facility: HOSPITAL | Age: 84
LOS: 2 days | End: 2019-10-23
Attending: INTERNAL MEDICINE | Admitting: INTERNAL MEDICINE
Payer: MEDICARE

## 2019-10-20 VITALS
SYSTOLIC BLOOD PRESSURE: 122 MMHG | DIASTOLIC BLOOD PRESSURE: 57 MMHG | RESPIRATION RATE: 8 BRPM | HEART RATE: 89 BPM | OXYGEN SATURATION: 100 %

## 2019-10-20 DIAGNOSIS — Z90.79 ACQUIRED ABSENCE OF OTHER GENITAL ORGAN(S): Chronic | ICD-10-CM

## 2019-10-20 DIAGNOSIS — R09.89 OTHER SPECIFIED SYMPTOMS AND SIGNS INVOLVING THE CIRCULATORY AND RESPIRATORY SYSTEMS: ICD-10-CM

## 2019-10-20 LAB
ALBUMIN SERPL ELPH-MCNC: 2.7 G/DL — LOW (ref 3.5–5.2)
ALBUMIN SERPL ELPH-MCNC: 3.3 G/DL — LOW (ref 3.5–5.2)
ALP SERPL-CCNC: 60 U/L — SIGNIFICANT CHANGE UP (ref 30–115)
ALP SERPL-CCNC: 72 U/L — SIGNIFICANT CHANGE UP (ref 30–115)
ALT FLD-CCNC: 1283 U/L — HIGH (ref 0–41)
ALT FLD-CCNC: >700 U/L — HIGH (ref 0–41)
ANION GAP SERPL CALC-SCNC: 14 MMOL/L — SIGNIFICANT CHANGE UP (ref 7–14)
ANION GAP SERPL CALC-SCNC: 14 MMOL/L — SIGNIFICANT CHANGE UP (ref 7–14)
APTT BLD: 33.8 SEC — SIGNIFICANT CHANGE UP (ref 27–39.2)
AST SERPL-CCNC: 921 U/L — HIGH (ref 0–41)
AST SERPL-CCNC: >700 U/L — HIGH (ref 0–41)
BASOPHILS # BLD AUTO: 0.01 K/UL — SIGNIFICANT CHANGE UP (ref 0–0.2)
BASOPHILS NFR BLD AUTO: 0.1 % — SIGNIFICANT CHANGE UP (ref 0–1)
BILIRUB SERPL-MCNC: 1.2 MG/DL — SIGNIFICANT CHANGE UP (ref 0.2–1.2)
BILIRUB SERPL-MCNC: 1.2 MG/DL — SIGNIFICANT CHANGE UP (ref 0.2–1.2)
BLD GP AB SCN SERPL QL: SIGNIFICANT CHANGE UP
BUN SERPL-MCNC: 54 MG/DL — HIGH (ref 10–20)
BUN SERPL-MCNC: 56 MG/DL — HIGH (ref 10–20)
CALCIUM SERPL-MCNC: 8.4 MG/DL — LOW (ref 8.5–10.1)
CALCIUM SERPL-MCNC: 9.2 MG/DL — SIGNIFICANT CHANGE UP (ref 8.5–10.1)
CHLORIDE SERPL-SCNC: 95 MMOL/L — LOW (ref 98–110)
CHLORIDE SERPL-SCNC: 98 MMOL/L — SIGNIFICANT CHANGE UP (ref 98–110)
CK SERPL-CCNC: 109 U/L — SIGNIFICANT CHANGE UP (ref 0–225)
CO2 SERPL-SCNC: 23 MMOL/L — SIGNIFICANT CHANGE UP (ref 17–32)
CO2 SERPL-SCNC: 26 MMOL/L — SIGNIFICANT CHANGE UP (ref 17–32)
CREAT SERPL-MCNC: 1.7 MG/DL — HIGH (ref 0.7–1.5)
CREAT SERPL-MCNC: 1.9 MG/DL — HIGH (ref 0.7–1.5)
EOSINOPHIL # BLD AUTO: 0.02 K/UL — SIGNIFICANT CHANGE UP (ref 0–0.7)
EOSINOPHIL NFR BLD AUTO: 0.2 % — SIGNIFICANT CHANGE UP (ref 0–8)
GAS PNL BLDA: SIGNIFICANT CHANGE UP
GLUCOSE SERPL-MCNC: 203 MG/DL — HIGH (ref 70–99)
GLUCOSE SERPL-MCNC: 219 MG/DL — HIGH (ref 70–99)
HCT VFR BLD CALC: 32.4 % — LOW (ref 42–52)
HCT VFR BLD CALC: 37.4 % — LOW (ref 42–52)
HGB BLD-MCNC: 10 G/DL — LOW (ref 14–18)
HGB BLD-MCNC: 11.4 G/DL — LOW (ref 14–18)
IMM GRANULOCYTES NFR BLD AUTO: 1.8 % — HIGH (ref 0.1–0.3)
INR BLD: 1.41 RATIO — HIGH (ref 0.65–1.3)
LACTATE SERPL-SCNC: 7 MMOL/L — CRITICAL HIGH (ref 0.5–2.2)
LYMPHOCYTES # BLD AUTO: 1.07 K/UL — LOW (ref 1.2–3.4)
LYMPHOCYTES # BLD AUTO: 12.3 % — LOW (ref 20.5–51.1)
MAGNESIUM SERPL-MCNC: 2.7 MG/DL — HIGH (ref 1.8–2.4)
MCHC RBC-ENTMCNC: 29.1 PG — SIGNIFICANT CHANGE UP (ref 27–31)
MCHC RBC-ENTMCNC: 29.5 PG — SIGNIFICANT CHANGE UP (ref 27–31)
MCHC RBC-ENTMCNC: 30.5 G/DL — LOW (ref 32–37)
MCHC RBC-ENTMCNC: 30.9 G/DL — LOW (ref 32–37)
MCV RBC AUTO: 94.2 FL — HIGH (ref 80–94)
MCV RBC AUTO: 96.6 FL — HIGH (ref 80–94)
MONOCYTES # BLD AUTO: 0.64 K/UL — HIGH (ref 0.1–0.6)
MONOCYTES NFR BLD AUTO: 7.3 % — SIGNIFICANT CHANGE UP (ref 1.7–9.3)
NEUTROPHILS # BLD AUTO: 6.82 K/UL — HIGH (ref 1.4–6.5)
NEUTROPHILS NFR BLD AUTO: 78.3 % — HIGH (ref 42.2–75.2)
NRBC # BLD: 0 /100 WBCS — SIGNIFICANT CHANGE UP (ref 0–0)
NRBC # BLD: 0 /100 WBCS — SIGNIFICANT CHANGE UP (ref 0–0)
NT-PROBNP SERPL-SCNC: 4524 PG/ML — HIGH (ref 0–300)
PLATELET # BLD AUTO: 159 K/UL — SIGNIFICANT CHANGE UP (ref 130–400)
PLATELET # BLD AUTO: 164 K/UL — SIGNIFICANT CHANGE UP (ref 130–400)
POTASSIUM SERPL-MCNC: 5.9 MMOL/L — HIGH (ref 3.5–5)
POTASSIUM SERPL-MCNC: 6.1 MMOL/L — CRITICAL HIGH (ref 3.5–5)
POTASSIUM SERPL-SCNC: 5.9 MMOL/L — HIGH (ref 3.5–5)
POTASSIUM SERPL-SCNC: 6.1 MMOL/L — CRITICAL HIGH (ref 3.5–5)
PROT SERPL-MCNC: 4.3 G/DL — LOW (ref 6–8)
PROT SERPL-MCNC: 5.2 G/DL — LOW (ref 6–8)
PROTHROM AB SERPL-ACNC: 16.2 SEC — HIGH (ref 9.95–12.87)
RBC # BLD: 3.44 M/UL — LOW (ref 4.7–6.1)
RBC # BLD: 3.87 M/UL — LOW (ref 4.7–6.1)
RBC # FLD: 17.2 % — HIGH (ref 11.5–14.5)
RBC # FLD: 17.3 % — HIGH (ref 11.5–14.5)
SODIUM SERPL-SCNC: 135 MMOL/L — SIGNIFICANT CHANGE UP (ref 135–146)
SODIUM SERPL-SCNC: 135 MMOL/L — SIGNIFICANT CHANGE UP (ref 135–146)
TROPONIN T SERPL-MCNC: 0.03 NG/ML — CRITICAL HIGH
WBC # BLD: 10.04 K/UL — SIGNIFICANT CHANGE UP (ref 4.8–10.8)
WBC # BLD: 8.72 K/UL — SIGNIFICANT CHANGE UP (ref 4.8–10.8)
WBC # FLD AUTO: 10.04 K/UL — SIGNIFICANT CHANGE UP (ref 4.8–10.8)
WBC # FLD AUTO: 8.72 K/UL — SIGNIFICANT CHANGE UP (ref 4.8–10.8)

## 2019-10-20 PROCEDURE — 99291 CRITICAL CARE FIRST HOUR: CPT | Mod: 25

## 2019-10-20 PROCEDURE — 71045 X-RAY EXAM CHEST 1 VIEW: CPT | Mod: 26

## 2019-10-20 PROCEDURE — 36556 INSERT NON-TUNNEL CV CATH: CPT

## 2019-10-20 PROCEDURE — 99292 CRITICAL CARE ADDL 30 MIN: CPT | Mod: 25

## 2019-10-20 PROCEDURE — 99291 CRITICAL CARE FIRST HOUR: CPT

## 2019-10-20 RX ORDER — SODIUM CHLORIDE 9 MG/ML
1000 INJECTION INTRAMUSCULAR; INTRAVENOUS; SUBCUTANEOUS
Refills: 0 | Status: DISCONTINUED | OUTPATIENT
Start: 2019-10-20 | End: 2019-10-23

## 2019-10-20 RX ORDER — CHLORHEXIDINE GLUCONATE 213 G/1000ML
1 SOLUTION TOPICAL
Refills: 0 | Status: DISCONTINUED | OUTPATIENT
Start: 2019-10-20 | End: 2019-10-23

## 2019-10-20 RX ORDER — ATENOLOL 25 MG/1
1 TABLET ORAL
Qty: 0 | Refills: 0 | DISCHARGE

## 2019-10-20 RX ORDER — NOREPINEPHRINE BITARTRATE/D5W 8 MG/250ML
0.05 PLASTIC BAG, INJECTION (ML) INTRAVENOUS
Qty: 8 | Refills: 0 | Status: DISCONTINUED | OUTPATIENT
Start: 2019-10-20 | End: 2019-10-23

## 2019-10-20 RX ORDER — TAMSULOSIN HYDROCHLORIDE 0.4 MG/1
1 CAPSULE ORAL
Qty: 0 | Refills: 0 | DISCHARGE

## 2019-10-20 RX ORDER — PANTOPRAZOLE SODIUM 20 MG/1
40 TABLET, DELAYED RELEASE ORAL DAILY
Refills: 0 | Status: DISCONTINUED | OUTPATIENT
Start: 2019-10-20 | End: 2019-10-23

## 2019-10-20 RX ORDER — TAMSULOSIN HYDROCHLORIDE 0.4 MG/1
0.4 CAPSULE ORAL AT BEDTIME
Refills: 0 | Status: DISCONTINUED | OUTPATIENT
Start: 2019-10-20 | End: 2019-10-23

## 2019-10-20 RX ORDER — FENTANYL CITRATE 50 UG/ML
0.5 INJECTION INTRAVENOUS
Qty: 2500 | Refills: 0 | Status: DISCONTINUED | OUTPATIENT
Start: 2019-10-20 | End: 2019-10-22

## 2019-10-20 RX ORDER — CHLORHEXIDINE GLUCONATE 213 G/1000ML
15 SOLUTION TOPICAL
Refills: 0 | Status: DISCONTINUED | OUTPATIENT
Start: 2019-10-20 | End: 2019-10-23

## 2019-10-20 RX ORDER — PROPOFOL 10 MG/ML
10 INJECTION, EMULSION INTRAVENOUS
Qty: 500 | Refills: 0 | Status: DISCONTINUED | OUTPATIENT
Start: 2019-10-20 | End: 2019-10-20

## 2019-10-20 RX ORDER — VANCOMYCIN HCL 1 G
1000 VIAL (EA) INTRAVENOUS ONCE
Refills: 0 | Status: COMPLETED | OUTPATIENT
Start: 2019-10-20 | End: 2019-10-20

## 2019-10-20 RX ADMIN — SODIUM CHLORIDE 50 MILLILITER(S): 9 INJECTION INTRAMUSCULAR; INTRAVENOUS; SUBCUTANEOUS at 23:00

## 2019-10-20 RX ADMIN — CHLORHEXIDINE GLUCONATE 1 APPLICATION(S): 213 SOLUTION TOPICAL at 22:00

## 2019-10-20 RX ADMIN — FENTANYL CITRATE 2.73 MICROGRAM(S)/KG/HR: 50 INJECTION INTRAVENOUS at 22:00

## 2019-10-20 RX ADMIN — Medication 5.11 MICROGRAM(S)/KG/MIN: at 19:02

## 2019-10-20 RX ADMIN — FENTANYL CITRATE 2.73 MICROGRAM(S)/KG/HR: 50 INJECTION INTRAVENOUS at 19:01

## 2019-10-20 RX ADMIN — Medication 250 MILLIGRAM(S): at 18:37

## 2019-10-20 RX ADMIN — Medication 5.11 MICROGRAM(S)/KG/MIN: at 22:00

## 2019-10-20 RX ADMIN — PROPOFOL 3.27 MICROGRAM(S)/KG/MIN: 10 INJECTION, EMULSION INTRAVENOUS at 18:37

## 2019-10-20 NOTE — ED PROVIDER NOTE - CLINICAL SUMMARY MEDICAL DECISION MAKING FREE TEXT BOX
Patient post cardiac arrest with rosc, we confirmed tube placement with vl, initiated pressor support, we and placed a femoral central line, we obtained labs and has consulted ICU for further evaluation and management. Patient remains in critical condition at this point cause of arrest is undetermined I will continue to monitor

## 2019-10-20 NOTE — H&P ADULT - NSHPLABSRESULTS_GEN_ALL_CORE
Mode: AC/ CMV (Assist Control/ Continuous Mandatory Ventilation)  RR (machine): 20  TV (machine): 450  FiO2: 60  PEEP: 5  MAP: 10  PIP: 23      ABG - ( 20 Oct 2019 19:58 )  pH, Arterial: 7.26  pH, Blood: x     /  pCO2: 50    /  pO2: 53    / HCO3: 23    / Base Excess: -4.0  /  SaO2: 82                  I&O's Detail        LABS:                        10.0   10.04 )-----------( 164      ( 20 Oct 2019 20:20 )             32.4     20 Oct 2019 20:20    135    |  98     |  54     ----------------------------<  203    5.9     |  23     |  1.7      Ca    8.4        20 Oct 2019 20:20  Mg     2.7       20 Oct 2019 18:20    TPro  4.3    /  Alb  2.7    /  TBili  1.2    /  DBili  x      /  AST  >700   /  ALT  >700   /  AlkPhos  60     20 Oct 2019 20:20  Amylase x     lipase x          CARDIAC MARKERS ( 20 Oct 2019 18:20 )  x     / 0.03 ng/mL / 109 U/L / x     / x          CAPILLARY BLOOD GLUCOSE      POCT Blood Glucose.: 198 mg/dL (20 Oct 2019 18:08)    PT/INR - ( 20 Oct 2019 18:20 )   PT: 16.20 sec;   INR: 1.41 ratio         PTT - ( 20 Oct 2019 18:20 )  PTT:33.8 sec    Culture    Lactate, Blood: 7.0 mmol/L (10-20-19 @ 18:20)      MEDICATIONS  (STANDING):  chlorhexidine 0.12% Liquid 15 milliLiter(s) Oral Mucosa two times a day  chlorhexidine 4% Liquid 1 Application(s) Topical two times a day  fentaNYL   Infusion. 0.5 MICROgram(s)/kG/Hr (2.727 mL/Hr) IV Continuous <Continuous>  norepinephrine Infusion 0.05 MICROgram(s)/kG/Min (5.113 mL/Hr) IV Continuous <Continuous>  pantoprazole  Injectable 40 milliGRAM(s) IV Push daily  sodium chloride 0.9%. 1000 milliLiter(s) (50 mL/Hr) IV Continuous <Continuous>  tamsulosin 0.4 milliGRAM(s) Oral at bedtime    MEDICATIONS  (PRN):        RADIOLOGY:

## 2019-10-20 NOTE — ED ADULT NURSE NOTE - NSIMPLEMENTINTERV_GEN_ALL_ED
Implemented All Fall with Harm Risk Interventions:  [place] to call system. Call bell, personal items and telephone within reach. Instruct patient to call for assistance. Room bathroom lighting operational. Non-slip footwear when patient is off stretcher. Physically safe environment: no spills, clutter or unnecessary equipment. Stretcher in lowest position, wheels locked, appropriate side rails in place. Provide visual cue, wrist band, yellow gown, etc. Monitor gait and stability. Monitor for mental status changes and reorient to person, place, and time. Review medications for side effects contributing to fall risk. Reinforce activity limits and safety measures with patient and family. Provide visual clues: red socks.

## 2019-10-20 NOTE — ED PROVIDER NOTE - PROGRESS NOTE DETAILS
Patient's son at bedside, provides more history as such - states over the last week patient has been complaining of generalized weakness, has had decreased po intake and sleeping more. Was starting to improve over the last 2 days, eating and drinking more however today had a fall thought to be due to weakness in which he hit his head. Was able to ambulate afterward unassisted, no LOC at that time. At breakfast and lunch w/o difficulty, was eating dinner when he suddenly became unresponsive. Son denies recent black/bloody stools, anticoagulant/antiplatelet use, chest pain, shortness of breath, fevers, recent illness. ICU fellow aware, will evaluate patient in ED. Dr. Jean-Baptiste and ICU fellow at bedside evaluating patient. Dr. Jean-Baptiste approves patient for admission to ICU, no further recommendations at this time. Hospitalist Dr. Mattson aware.

## 2019-10-20 NOTE — H&P ADULT - HISTORY OF PRESENT ILLNESS
generalized weakness x 1 week  cough, poor PO intake  at dinner, sitting, passed out  chest not rising  EMS came CPR started  no previous cardiac arrest   no cardiac hx 88yo M w/ PMH of HTN and BPH presents to ED s/p cardiac arrest. As per son at bedside, pt had generalized weakness x 1 week. Associated w/ non-productive cough and poor PO intake. No fever noted. Pt was sitting and eating dinner at table, suddenly lost consciousness and stopped breathing.    generalized weakness x 1 week  cough, poor PO intake  at dinner, sitting, passed out  chest not rising  EMS came CPR started  no previous cardiac arrest   no cardiac hx 88yo M w/ PMH of HTN and BPH presents to ED s/p cardiac arrest. As per son at bedside, pt had generalized weakness x 1 week. Associated w/ non-productive cough and poor PO intake. No fever noted. Pt was sitting and eating dinner at table, suddenly lost consciousness and stopped breathing. Son called EMS, EMS came within few minutes, found asystole on pt, started ACLS. Pt had ROSC after 4 rounds of epi. Pt intubated on the field. Pt 89yo M w/ PMH of HTN and BPH presents to ED s/p cardiac arrest. As per son at bedside, pt had generalized weakness x 1 week. Associated w/ non-productive cough and poor PO intake. No fever noted. Pt was sitting and eating dinner at table, suddenly lost consciousness and stopped breathing. Son called EMS, EMS came within few minutes, found asystole on pt, started ACLS. Pt had ROSC after 4 rounds of epi. Pt intubated on the field. In ED, pt was found to be hypotensive and hypothermic. Central line placed in, pressors started, and received IV abx. As per ED attending, dark stool noted. Hgb 11.4. Repeat CBC shows Hgb 10. 89yo M w/ PMH of HTN and BPH presents to ED s/p cardiac arrest. As per son at bedside, pt had generalized weakness x 1 week. Associated w/ non-productive cough and poor PO intake. No fever noted. Pt was sitting and eating dinner at table, suddenly lost consciousness and stopped breathing. Son called EMS, EMS came within few minutes, found asystole on pt, started ACLS. Pt had ROSC after 4 rounds of epi. Pt intubated on the field. In ED, pt was found to be hypotensive and hypothermic. Central line placed in, pressors started, and received IV abx. As per ED attending, dark bloody stool noted. Hgb 11.4. Repeat CBC shows Hgb 10.

## 2019-10-20 NOTE — PATIENT PROFILE ADULT - STATED REASON FOR ADMISSION
pt's son states he has not been feeling well this passed week. - increased weakness, sleeping 12-16 hrs, loss of appetite, and had periods of hallucination and confusion. felt better today. pt fell and hit head but got up w/o problems. at dinner, pt slumped over in his chair and became unresponsive.  son called 911.

## 2019-10-20 NOTE — ED PROVIDER NOTE - ATTENDING CONTRIBUTION TO CARE
I was present for and supervised the key and critical aspects of the procedures performed during the care of the patient. 89 year old male w hx of HTN, BPH presents to the ED via EMS after a witnessed cardiac arrest patient was at home eating dinner approximately 2 hours prior to arrival when he suddenly slumped over and became unresponsive. family called 911. Upon EMS arrival patient was in asystole, unresponsive Given 4 rounds of epi, CPR, and calcium with ROSC. Patient was intubated on scene and transported to ED. upon arrival to the ED tube placement confirmed with vl, we initiated iv fluids therapy as well as pressor support, we obtained labs and have consulted ICU who evaluated patient in the ED. I will admit to icu for further management at this time

## 2019-10-20 NOTE — ED PROCEDURE NOTE - NS ED ATTENDING STATEMENT MOD
Check here if all serologies below were negative, non-reactive or immune. Otherwise select appropriate status. I have personally performed a face to face diagnostic evaluation on this patient. I have reviewed the ACP note and agree with the history, exam and plan of care, except as noted.

## 2019-10-20 NOTE — ED PROVIDER NOTE - CARE PLAN
Principal Discharge DX:	Cardiac arrest  Secondary Diagnosis:	Elevated lactic acid level  Secondary Diagnosis:	Hyponatremia

## 2019-10-20 NOTE — ED PROVIDER NOTE - PHYSICAL EXAMINATION
VITALS:  I have reviewed the initial vital signs.  GENERAL: Well-developed, thin elderly male. Unresponsive.  HEENT: NC/AT. Sclera clear. No conjunctival injection or pallor. Pupils 2 mm b/l, unresponsive. Dry mucous membranes.   NECK: supple. No JVD.  CARDIO: RRR, nl S1 and S2. No murmurs, rubs, or gallops. No peripheral edema. 2+ radial and pedal pulses bilaterally.  PULM: Intubated and ventilated. +breath sounds b/l with coarse breath sounds throughout.   GI: Abdomen soft and non-distended. Suprapubic catheter in place to mid abdomen w/o surrounding erythema, d/c, or drainage. Dark stool on rectal exam. No BRBPR.   SKIN: Cool, dry. Capillary refill <2 seconds.  NEURO: Unresponsive, intubated. No response to painful or verbal stimuli. VITALS:  I have reviewed the initial vital signs.  GENERAL: Well-developed, thin elderly male. Unresponsive.  HEENT: NC/AT. Sclera clear. No conjunctival injection or pallor. Pupils 2 mm b/l, unresponsive. Dry mucous membranes.   NECK: supple. No JVD.  CARDIO:  nl S1 and S2. No murmurs, rubs, or gallops. No peripheral edema. 2+ radial and pedal pulses bilaterally.  PULM: Intubated and ventilated. +breath sounds b/l with coarse breath sounds throughout.   GI: Abdomen soft and non-distended. Suprapubic catheter in place to mid abdomen w/o surrounding erythema, d/c, or drainage. Dark stool on rectal exam. No BRBPR.   SKIN: Cool, dry. Capillary refill <2 seconds.  NEURO: Unresponsive, intubated. No response to painful or verbal stimuli.

## 2019-10-20 NOTE — ED ADULT NURSE NOTE - CHIEF COMPLAINT QUOTE
BIBA from home post cardiac arrest was witnessed by family as per EMS was eating dinner and went down. Downtime approx 20 mins. Pt received 4 epis, an amp of d50, an amp of bicarb, 800ml of fluids, a dopamine drip, and an amp of calcium." pt was intubated in the field et tube is 7.5 with a 26 lip line with rac #[id number] placed in the field

## 2019-10-20 NOTE — ED ADULT TRIAGE NOTE - CHIEF COMPLAINT QUOTE
BIBA from home post cardiac arrest was witnessed by family as per EMS was eating dinner and went down. Downtime approx 20 mins. Pt received 4 epis, an amp of d50, an amp of bicarb, and an amp of calcium." pt was intubated in the field et tube is 7.5 with rac #[id number] placed in the field BIBA from home post cardiac arrest was witnessed by family as per EMS was eating dinner and went down. Downtime approx 20 mins. Pt received 4 epis, an amp of d50, an amp of bicarb, 800ml of fluids, a dopamine drip, and an amp of calcium." pt was intubated in the field et tube is 7.5 with a 26 lip line with rac #[id number] placed in the field

## 2019-10-20 NOTE — H&P ADULT - ASSESSMENT
89yo M w/ PMH of HTN and BPH presents to ED s/p cardiac arrest. Pt was sitting and eating dinner at table, suddenly lost consciousness and stopped breathing. Son called EMS, EMS came within few minutes, found asystole on pt, started ACLS. Pt had ROSC after 4 rounds of epi. Pt intubated on the field.     Discussed w/ Dr. Jean-Baptiste. Admit to critical care      Cardiac arrest  - unknown etiology for now.  - PE can't be r/o. Pt unstable to get CTA chest for now, will get CTA chest when pt is more stable  - R femoral central line placed in  - c/w pressors as needed  - NPO  - gentle IV hydration  - hypothermia protocol  - Keep K > 4, Mg > 2  - ICU monitoring  - cardiology consult  - neurology consult to r/o anoxic brain injury    Hyperkalemia  - Will repeat BMP, and fix accordingly  - BMP in am, f/u lytes    HTN  - hold anti-htn meds for now due to hypotension      DVT prophylaxis: SCDs  GI prophylaxis: PPI  Diet: NPO  Code status: full code 88yo M w/ PMH of HTN and BPH presents to ED s/p cardiac arrest. Pt was sitting and eating dinner at table, suddenly lost consciousness and stopped breathing. Son called EMS, EMS came within few minutes, found asystole on pt, started ACLS. Pt had ROSC after 4 rounds of epi. Pt intubated on the field. As per ED attending, dark bloody stool noted. Hgb 11.4. Repeat CBC shows Hgb 10.     Discussed w/ Dr. Jean-Baptiste. Admit to critical care      Cardiac arrest  - unknown etiology for now.  - PE can't be r/o. Pt unstable to get CTA chest for now, will get CTA chest when pt is more stable  - no heparin drip for now due to possible GI bleed  - R femoral central line placed in  - c/w pressors as needed  - NPO  - gentle IV hydration  - Keep temp 32 to 34 degrees celsius  - Keep K > 4, Mg > 2  - ICU monitoring  - cardiology consult  - neurology consult to r/o anoxic brain injury    Upper GI bleed?  - Hgb 11.4 --> 10  - Serial CBCs  - transfuse as needed   - Will hold AC  - SCDs for DVT prophylaxis    Hyperkalemia  - Will repeat BMP, and fix accordingly  - BMP in am, f/u lytes    HTN  - hold anti-htn meds for now due to hypotension      DVT prophylaxis: SCDs  GI prophylaxis: PPI  Diet: NPO  Code status: full code

## 2019-10-20 NOTE — H&P ADULT - NSHPPHYSICALEXAM_GEN_ALL_CORE
ICU Vital Signs Last 24 Hrs  T(C): 32.7 (20 Oct 2019 20:23), Max: 34.1 (20 Oct 2019 18:05)  T(F): 90.9 (20 Oct 2019 20:23), Max: 93.4 (20 Oct 2019 18:05)  HR: 67 (20 Oct 2019 19:55) (66 - 103)  BP: 147/89 (20 Oct 2019 19:55) (74/41 - 153/72)  BP(mean): --  ABP: --  ABP(mean): --  RR: 20 (20 Oct 2019 19:41) (8 - 20)  SpO2: 98% (20 Oct 2019 19:41) (98% - 100%)        Physical Examination:    General: No acute distress.  Alert, oriented, interactive, nonfocal    HEENT: Pupils equal, reactive to light.  Symmetric.    PULM: Clear to auscultation bilaterally, no significant sputum production    CVS: Regular rate and rhythm, no murmurs, rubs, or gallops    ABD: Soft, nondistended, nontender, normoactive bowel sounds, no masses    EXT: No edema, nontender    SKIN: Warm and well perfused, no rashes noted.

## 2019-10-21 LAB
ALBUMIN SERPL ELPH-MCNC: 2.7 G/DL — LOW (ref 3.5–5.2)
ALBUMIN SERPL ELPH-MCNC: 2.8 G/DL — LOW (ref 3.5–5.2)
ALP SERPL-CCNC: 55 U/L — SIGNIFICANT CHANGE UP (ref 30–115)
ALP SERPL-CCNC: 57 U/L — SIGNIFICANT CHANGE UP (ref 30–115)
ALT FLD-CCNC: 1168 U/L — HIGH (ref 0–41)
ALT FLD-CCNC: >700 U/L — HIGH (ref 0–41)
ANION GAP SERPL CALC-SCNC: 11 MMOL/L — SIGNIFICANT CHANGE UP (ref 7–14)
ANION GAP SERPL CALC-SCNC: 12 MMOL/L — SIGNIFICANT CHANGE UP (ref 7–14)
ANION GAP SERPL CALC-SCNC: 9 MMOL/L — SIGNIFICANT CHANGE UP (ref 7–14)
APPEARANCE UR: CLEAR — SIGNIFICANT CHANGE UP
AST SERPL-CCNC: 371 U/L — HIGH (ref 0–41)
AST SERPL-CCNC: 811 U/L — HIGH (ref 0–41)
BACTERIA # UR AUTO: ABNORMAL
BASE EXCESS BLDA CALC-SCNC: -0.1 MMOL/L — SIGNIFICANT CHANGE UP (ref -2–2)
BASOPHILS # BLD AUTO: 0.01 K/UL — SIGNIFICANT CHANGE UP (ref 0–0.2)
BASOPHILS # BLD AUTO: 0.01 K/UL — SIGNIFICANT CHANGE UP (ref 0–0.2)
BASOPHILS NFR BLD AUTO: 0.1 % — SIGNIFICANT CHANGE UP (ref 0–1)
BASOPHILS NFR BLD AUTO: 0.1 % — SIGNIFICANT CHANGE UP (ref 0–1)
BILIRUB SERPL-MCNC: 0.8 MG/DL — SIGNIFICANT CHANGE UP (ref 0.2–1.2)
BILIRUB SERPL-MCNC: 0.9 MG/DL — SIGNIFICANT CHANGE UP (ref 0.2–1.2)
BILIRUB UR-MCNC: ABNORMAL
BUN SERPL-MCNC: 59 MG/DL — HIGH (ref 10–20)
BUN SERPL-MCNC: 62 MG/DL — CRITICAL HIGH (ref 10–20)
BUN SERPL-MCNC: 69 MG/DL — CRITICAL HIGH (ref 10–20)
CALCIUM SERPL-MCNC: 7.7 MG/DL — LOW (ref 8.5–10.1)
CALCIUM SERPL-MCNC: 8 MG/DL — LOW (ref 8.5–10.1)
CALCIUM SERPL-MCNC: 8.1 MG/DL — LOW (ref 8.5–10.1)
CHLORIDE SERPL-SCNC: 101 MMOL/L — SIGNIFICANT CHANGE UP (ref 98–110)
CHLORIDE SERPL-SCNC: 102 MMOL/L — SIGNIFICANT CHANGE UP (ref 98–110)
CHLORIDE SERPL-SCNC: 99 MMOL/L — SIGNIFICANT CHANGE UP (ref 98–110)
CK MB CFR SERPL CALC: 11.6 NG/ML — HIGH (ref 0.6–6.3)
CK SERPL-CCNC: 205 U/L — SIGNIFICANT CHANGE UP (ref 0–225)
CO2 SERPL-SCNC: 24 MMOL/L — SIGNIFICANT CHANGE UP (ref 17–32)
CO2 SERPL-SCNC: 26 MMOL/L — SIGNIFICANT CHANGE UP (ref 17–32)
CO2 SERPL-SCNC: 27 MMOL/L — SIGNIFICANT CHANGE UP (ref 17–32)
COLOR SPEC: YELLOW — SIGNIFICANT CHANGE UP
CREAT SERPL-MCNC: 1.7 MG/DL — HIGH (ref 0.7–1.5)
CREAT SERPL-MCNC: 1.8 MG/DL — HIGH (ref 0.7–1.5)
CREAT SERPL-MCNC: 2.2 MG/DL — HIGH (ref 0.7–1.5)
DIFF PNL FLD: ABNORMAL
EOSINOPHIL # BLD AUTO: 0 K/UL — SIGNIFICANT CHANGE UP (ref 0–0.7)
EOSINOPHIL # BLD AUTO: 0 K/UL — SIGNIFICANT CHANGE UP (ref 0–0.7)
EOSINOPHIL NFR BLD AUTO: 0 % — SIGNIFICANT CHANGE UP (ref 0–8)
EOSINOPHIL NFR BLD AUTO: 0 % — SIGNIFICANT CHANGE UP (ref 0–8)
EPI CELLS # UR: ABNORMAL /HPF
GLUCOSE SERPL-MCNC: 121 MG/DL — HIGH (ref 70–99)
GLUCOSE SERPL-MCNC: 182 MG/DL — HIGH (ref 70–99)
GLUCOSE SERPL-MCNC: 76 MG/DL — SIGNIFICANT CHANGE UP (ref 70–99)
GLUCOSE UR QL: NEGATIVE MG/DL — SIGNIFICANT CHANGE UP
HCO3 BLDA-SCNC: 26 MMOL/L — SIGNIFICANT CHANGE UP (ref 23–27)
HCT VFR BLD CALC: 32.5 % — LOW (ref 42–52)
HCT VFR BLD CALC: 33.2 % — LOW (ref 42–52)
HCT VFR BLD CALC: 34.4 % — LOW (ref 42–52)
HGB BLD-MCNC: 10.3 G/DL — LOW (ref 14–18)
HGB BLD-MCNC: 10.4 G/DL — LOW (ref 14–18)
HGB BLD-MCNC: 10.7 G/DL — LOW (ref 14–18)
HOROWITZ INDEX BLDA+IHG-RTO: 60 — SIGNIFICANT CHANGE UP
IMM GRANULOCYTES NFR BLD AUTO: 0.5 % — HIGH (ref 0.1–0.3)
IMM GRANULOCYTES NFR BLD AUTO: 0.8 % — HIGH (ref 0.1–0.3)
KETONES UR-MCNC: ABNORMAL
LACTATE SERPL-SCNC: 2.7 MMOL/L — HIGH (ref 0.5–2.2)
LEUKOCYTE ESTERASE UR-ACNC: ABNORMAL
LYMPHOCYTES # BLD AUTO: 1.14 K/UL — LOW (ref 1.2–3.4)
LYMPHOCYTES # BLD AUTO: 15 % — LOW (ref 20.5–51.1)
LYMPHOCYTES # BLD AUTO: 2.25 K/UL — SIGNIFICANT CHANGE UP (ref 1.2–3.4)
LYMPHOCYTES # BLD AUTO: 9.5 % — LOW (ref 20.5–51.1)
MAGNESIUM SERPL-MCNC: 2.2 MG/DL — SIGNIFICANT CHANGE UP (ref 1.8–2.4)
MCHC RBC-ENTMCNC: 29.1 PG — SIGNIFICANT CHANGE UP (ref 27–31)
MCHC RBC-ENTMCNC: 29.1 PG — SIGNIFICANT CHANGE UP (ref 27–31)
MCHC RBC-ENTMCNC: 29.5 PG — SIGNIFICANT CHANGE UP (ref 27–31)
MCHC RBC-ENTMCNC: 31.1 G/DL — LOW (ref 32–37)
MCHC RBC-ENTMCNC: 31.3 G/DL — LOW (ref 32–37)
MCHC RBC-ENTMCNC: 31.7 G/DL — LOW (ref 32–37)
MCV RBC AUTO: 92.7 FL — SIGNIFICANT CHANGE UP (ref 80–94)
MCV RBC AUTO: 93.1 FL — SIGNIFICANT CHANGE UP (ref 80–94)
MCV RBC AUTO: 93.5 FL — SIGNIFICANT CHANGE UP (ref 80–94)
MONOCYTES # BLD AUTO: 1.21 K/UL — HIGH (ref 0.1–0.6)
MONOCYTES # BLD AUTO: 1.38 K/UL — HIGH (ref 0.1–0.6)
MONOCYTES NFR BLD AUTO: 10.1 % — HIGH (ref 1.7–9.3)
MONOCYTES NFR BLD AUTO: 9.2 % — SIGNIFICANT CHANGE UP (ref 1.7–9.3)
NEUTROPHILS # BLD AUTO: 11.29 K/UL — HIGH (ref 1.4–6.5)
NEUTROPHILS # BLD AUTO: 9.55 K/UL — HIGH (ref 1.4–6.5)
NEUTROPHILS NFR BLD AUTO: 75.2 % — SIGNIFICANT CHANGE UP (ref 42.2–75.2)
NEUTROPHILS NFR BLD AUTO: 79.5 % — HIGH (ref 42.2–75.2)
NITRITE UR-MCNC: NEGATIVE — SIGNIFICANT CHANGE UP
NRBC # BLD: 0 /100 WBCS — SIGNIFICANT CHANGE UP (ref 0–0)
NRBC # BLD: 0 /100 WBCS — SIGNIFICANT CHANGE UP (ref 0–0)
NRBC # BLD: 1 /100 WBCS — HIGH (ref 0–0)
PCO2 BLDA: 45 MMHG — HIGH (ref 38–42)
PH BLDA: 7.36 — LOW (ref 7.38–7.42)
PH UR: 6 — SIGNIFICANT CHANGE UP (ref 5–8)
PHOSPHATE SERPL-MCNC: 5.3 MG/DL — HIGH (ref 2.1–4.9)
PLATELET # BLD AUTO: 164 K/UL — SIGNIFICANT CHANGE UP (ref 130–400)
PLATELET # BLD AUTO: 168 K/UL — SIGNIFICANT CHANGE UP (ref 130–400)
PLATELET # BLD AUTO: 185 K/UL — SIGNIFICANT CHANGE UP (ref 130–400)
PO2 BLDA: 79 MMHG — SIGNIFICANT CHANGE UP (ref 78–95)
POTASSIUM SERPL-MCNC: 5.5 MMOL/L — HIGH (ref 3.5–5)
POTASSIUM SERPL-MCNC: 6.1 MMOL/L — CRITICAL HIGH (ref 3.5–5)
POTASSIUM SERPL-MCNC: 6.2 MMOL/L — CRITICAL HIGH (ref 3.5–5)
POTASSIUM SERPL-SCNC: 5.5 MMOL/L — HIGH (ref 3.5–5)
POTASSIUM SERPL-SCNC: 6.1 MMOL/L — CRITICAL HIGH (ref 3.5–5)
POTASSIUM SERPL-SCNC: 6.2 MMOL/L — CRITICAL HIGH (ref 3.5–5)
PROT SERPL-MCNC: 4.3 G/DL — LOW (ref 6–8)
PROT SERPL-MCNC: 4.5 G/DL — LOW (ref 6–8)
PROT UR-MCNC: >=300 MG/DL
RBC # BLD: 3.49 M/UL — LOW (ref 4.7–6.1)
RBC # BLD: 3.58 M/UL — LOW (ref 4.7–6.1)
RBC # BLD: 3.68 M/UL — LOW (ref 4.7–6.1)
RBC # FLD: 17.2 % — HIGH (ref 11.5–14.5)
RBC # FLD: 17.5 % — HIGH (ref 11.5–14.5)
RBC # FLD: 18.2 % — HIGH (ref 11.5–14.5)
RBC CASTS # UR COMP ASSIST: ABNORMAL /HPF
SAO2 % BLDA: 96 % — SIGNIFICANT CHANGE UP (ref 94–98)
SODIUM SERPL-SCNC: 136 MMOL/L — SIGNIFICANT CHANGE UP (ref 135–146)
SODIUM SERPL-SCNC: 137 MMOL/L — SIGNIFICANT CHANGE UP (ref 135–146)
SODIUM SERPL-SCNC: 138 MMOL/L — SIGNIFICANT CHANGE UP (ref 135–146)
SP GR SPEC: >=1.03 (ref 1.01–1.03)
TROPONIN T SERPL-MCNC: 0.07 NG/ML — CRITICAL HIGH
UROBILINOGEN FLD QL: 0.2 MG/DL — SIGNIFICANT CHANGE UP (ref 0.2–0.2)
WBC # BLD: 10.55 K/UL — SIGNIFICANT CHANGE UP (ref 4.8–10.8)
WBC # BLD: 12 K/UL — HIGH (ref 4.8–10.8)
WBC # BLD: 15.01 K/UL — HIGH (ref 4.8–10.8)
WBC # FLD AUTO: 10.55 K/UL — SIGNIFICANT CHANGE UP (ref 4.8–10.8)
WBC # FLD AUTO: 12 K/UL — HIGH (ref 4.8–10.8)
WBC # FLD AUTO: 15.01 K/UL — HIGH (ref 4.8–10.8)
WBC UR QL: ABNORMAL /HPF

## 2019-10-21 PROCEDURE — 99223 1ST HOSP IP/OBS HIGH 75: CPT

## 2019-10-21 PROCEDURE — 71046 X-RAY EXAM CHEST 2 VIEWS: CPT | Mod: 26

## 2019-10-21 PROCEDURE — 93970 EXTREMITY STUDY: CPT | Mod: 26

## 2019-10-21 PROCEDURE — 71045 X-RAY EXAM CHEST 1 VIEW: CPT | Mod: 26,76

## 2019-10-21 PROCEDURE — 99222 1ST HOSP IP/OBS MODERATE 55: CPT

## 2019-10-21 PROCEDURE — 76705 ECHO EXAM OF ABDOMEN: CPT | Mod: 26

## 2019-10-21 PROCEDURE — 99233 SBSQ HOSP IP/OBS HIGH 50: CPT

## 2019-10-21 PROCEDURE — 99497 ADVNCD CARE PLAN 30 MIN: CPT | Mod: 25

## 2019-10-21 RX ORDER — LEVETIRACETAM 250 MG/1
250 TABLET, FILM COATED ORAL EVERY 12 HOURS
Refills: 0 | Status: DISCONTINUED | OUTPATIENT
Start: 2019-10-21 | End: 2019-10-23

## 2019-10-21 RX ORDER — DEXTROSE 50 % IN WATER 50 %
50 SYRINGE (ML) INTRAVENOUS ONCE
Refills: 0 | Status: COMPLETED | OUTPATIENT
Start: 2019-10-21 | End: 2019-10-21

## 2019-10-21 RX ORDER — SODIUM POLYSTYRENE SULFONATE 4.1 MEQ/G
30 POWDER, FOR SUSPENSION ORAL EVERY 6 HOURS
Refills: 0 | Status: COMPLETED | OUTPATIENT
Start: 2019-10-21 | End: 2019-10-22

## 2019-10-21 RX ORDER — DEXTROSE 50 % IN WATER 50 %
250 SYRINGE (ML) INTRAVENOUS ONCE
Refills: 0 | Status: DISCONTINUED | OUTPATIENT
Start: 2019-10-21 | End: 2019-10-21

## 2019-10-21 RX ORDER — SODIUM CHLORIDE 9 MG/ML
500 INJECTION INTRAMUSCULAR; INTRAVENOUS; SUBCUTANEOUS ONCE
Refills: 0 | Status: COMPLETED | OUTPATIENT
Start: 2019-10-21 | End: 2019-10-21

## 2019-10-21 RX ORDER — INFLUENZA VIRUS VACCINE 15; 15; 15; 15 UG/.5ML; UG/.5ML; UG/.5ML; UG/.5ML
0.5 SUSPENSION INTRAMUSCULAR ONCE
Refills: 0 | Status: DISCONTINUED | OUTPATIENT
Start: 2019-10-21 | End: 2019-10-23

## 2019-10-21 RX ORDER — INSULIN HUMAN 100 [IU]/ML
10 INJECTION, SOLUTION SUBCUTANEOUS ONCE
Refills: 0 | Status: COMPLETED | OUTPATIENT
Start: 2019-10-21 | End: 2019-10-21

## 2019-10-21 RX ORDER — INSULIN HUMAN 100 [IU]/ML
10 INJECTION, SOLUTION SUBCUTANEOUS ONCE
Refills: 0 | Status: DISCONTINUED | OUTPATIENT
Start: 2019-10-21 | End: 2019-10-21

## 2019-10-21 RX ORDER — SODIUM CHLORIDE 9 MG/ML
250 INJECTION INTRAMUSCULAR; INTRAVENOUS; SUBCUTANEOUS ONCE
Refills: 0 | Status: COMPLETED | OUTPATIENT
Start: 2019-10-21 | End: 2019-10-21

## 2019-10-21 RX ADMIN — PANTOPRAZOLE SODIUM 40 MILLIGRAM(S): 20 TABLET, DELAYED RELEASE ORAL at 11:36

## 2019-10-21 RX ADMIN — FENTANYL CITRATE 2.73 MICROGRAM(S)/KG/HR: 50 INJECTION INTRAVENOUS at 07:30

## 2019-10-21 RX ADMIN — CHLORHEXIDINE GLUCONATE 1 APPLICATION(S): 213 SOLUTION TOPICAL at 18:04

## 2019-10-21 RX ADMIN — Medication 5.11 MICROGRAM(S)/KG/MIN: at 04:00

## 2019-10-21 RX ADMIN — Medication 5.11 MICROGRAM(S)/KG/MIN: at 07:30

## 2019-10-21 RX ADMIN — SODIUM CHLORIDE 1500 MILLILITER(S): 9 INJECTION INTRAMUSCULAR; INTRAVENOUS; SUBCUTANEOUS at 11:00

## 2019-10-21 RX ADMIN — Medication 100 MILLIGRAM(S): at 21:11

## 2019-10-21 RX ADMIN — TAMSULOSIN HYDROCHLORIDE 0.4 MILLIGRAM(S): 0.4 CAPSULE ORAL at 21:10

## 2019-10-21 RX ADMIN — SODIUM CHLORIDE 50 MILLILITER(S): 9 INJECTION INTRAMUSCULAR; INTRAVENOUS; SUBCUTANEOUS at 06:00

## 2019-10-21 RX ADMIN — SODIUM POLYSTYRENE SULFONATE 30 GRAM(S): 4.1 POWDER, FOR SUSPENSION ORAL at 21:10

## 2019-10-21 RX ADMIN — SODIUM CHLORIDE 150 MILLILITER(S): 9 INJECTION INTRAMUSCULAR; INTRAVENOUS; SUBCUTANEOUS at 17:40

## 2019-10-21 RX ADMIN — INSULIN HUMAN 10 UNIT(S): 100 INJECTION, SOLUTION SUBCUTANEOUS at 21:12

## 2019-10-21 RX ADMIN — Medication 5.11 MICROGRAM(S)/KG/MIN: at 21:11

## 2019-10-21 RX ADMIN — Medication 50 MILLILITER(S): at 20:15

## 2019-10-21 RX ADMIN — Medication 50 MILLILITER(S): at 11:19

## 2019-10-21 RX ADMIN — CHLORHEXIDINE GLUCONATE 15 MILLILITER(S): 213 SOLUTION TOPICAL at 05:26

## 2019-10-21 RX ADMIN — SODIUM CHLORIDE 50 MILLILITER(S): 9 INJECTION INTRAMUSCULAR; INTRAVENOUS; SUBCUTANEOUS at 07:30

## 2019-10-21 RX ADMIN — Medication 5.11 MICROGRAM(S)/KG/MIN: at 11:36

## 2019-10-21 RX ADMIN — INSULIN HUMAN 10 UNIT(S): 100 INJECTION, SOLUTION SUBCUTANEOUS at 12:20

## 2019-10-21 RX ADMIN — CHLORHEXIDINE GLUCONATE 15 MILLILITER(S): 213 SOLUTION TOPICAL at 18:03

## 2019-10-21 RX ADMIN — CHLORHEXIDINE GLUCONATE 1 APPLICATION(S): 213 SOLUTION TOPICAL at 11:36

## 2019-10-21 RX ADMIN — SODIUM CHLORIDE 1000 MILLILITER(S): 9 INJECTION INTRAMUSCULAR; INTRAVENOUS; SUBCUTANEOUS at 05:24

## 2019-10-21 RX ADMIN — LEVETIRACETAM 210 MILLIGRAM(S): 250 TABLET, FILM COATED ORAL at 18:23

## 2019-10-21 RX ADMIN — Medication 100 MILLIGRAM(S): at 13:21

## 2019-10-21 NOTE — CONSULT NOTE ADULT - SUBJECTIVE AND OBJECTIVE BOX
Neurology Consultation note    Name  OCTAVIO DORSEY    HPI:  88yo M w/ PMH of HTN and BPH presents to ED s/p cardiac arrest. As per son at bedside, pt had generalized weakness x 1 week. Associated w/ non-productive cough and poor PO intake. No fever noted. Pt was sitting and eating dinner at table, suddenly lost consciousness and stopped breathing. Son called EMS, EMS came within few minutes, found asystole on pt, started ACLS. Pt had ROSC after 4 rounds of epi. Pt intubated on the field. In ED, pt was found to be hypotensive and hypothermic. Central line placed in, pressors started, and received IV abx. As per ED attending, dark bloody stool noted. Hgb 11.4. Repeat CBC shows Hgb 10. (20 Oct 2019 20:15)      NEURO  PATIENT IS AN 88 YO MAN S/P CE, 20 MINUTES DOWN. PATIENT CURRENTLY SEDATED ON FENTANYL    Vital Signs Last 24 Hrs  T(C): 37.3 (21 Oct 2019 07:10), Max: 37.3 (21 Oct 2019 07:10)  T(F): 99.1 (21 Oct 2019 07:10), Max: 99.1 (21 Oct 2019 07:10)  HR: 62 (21 Oct 2019 10:01) (55 - 103)  BP: 115/56 (21 Oct 2019 10:01) (74/41 - 172/97)  BP(mean): 81 (21 Oct 2019 10:01) (66 - 129)  RR: 21 (21 Oct 2019 09:00) (8 - 21)  SpO2: 98% (21 Oct 2019 10:01) (93% - 100%)    Neurological Exam:   NO RESPONSE TO DEEP PAIN\  ABSENT CORNEALS AND OCRS  PUPILS PP   EYES MIDLINE  FACE SYMMETRIC  MYOCLONIC JERKING OF THE FACE  NO W/D TO DEEP PAIN    Medications  chlorhexidine 0.12% Liquid 15 milliLiter(s) Oral Mucosa two times a day  chlorhexidine 4% Liquid 1 Application(s) Topical two times a day  fentaNYL   Infusion. 0.5 MICROgram(s)/kG/Hr IV Continuous <Continuous>  influenza   Vaccine 0.5 milliLiter(s) IntraMuscular once  norepinephrine Infusion 0.05 MICROgram(s)/kG/Min IV Continuous <Continuous>  pantoprazole  Injectable 40 milliGRAM(s) IV Push daily  sodium chloride 0.9%. 1000 milliLiter(s) IV Continuous <Continuous>  tamsulosin 0.4 milliGRAM(s) Oral at bedtime      Lab  10-21    137  |  101  |  62<HH>  ----------------------------<  121<H>  6.2<HH>   |  27  |  1.8<H>    Ca    8.0<L>      21 Oct 2019 06:15  Phos  5.3     10-21  Mg     2.2     10-21    TPro  4.5<L>  /  Alb  2.7<L>  /  TBili  0.9  /  DBili  x   /  AST  811<H>  /  ALT  1168<H>  /  AlkPhos  57  10-21                          10.4   12.00 )-----------( 185      ( 21 Oct 2019 06:15 )             33.2     LIVER FUNCTIONS - ( 21 Oct 2019 06:15 )  Alb: 2.7 g/dL / Pro: 4.5 g/dL / ALK PHOS: 57 U/L / ALT: 1168 U/L / AST: 811 U/L / GGT: x             2.2  2.7        Radiology      Assessment: 88 YO MAN S/P CE  LIKELY > 20 MIN  MYOCLONUS ON EXAM    Plan:  REEG   NO AEDS FOR NOW  PROGNOSIS GUARDED AT PRESENT, WILL LIKELY BE POOR

## 2019-10-21 NOTE — CONSULT NOTE ADULT - ASSESSMENT
89yMale pmh HTN, BPH s/p cardiac arrest s/p 4 rounds CPR with ROSC achieved. Patient intubated on pressors. GI Consulted for elevated LFTs.      Problem 1-Elevated LFTs likely ischemic hepatitis   Rec  -Ultrasound abdomen   -Trend LFTs daily  -Can Check Hepatitis B Sag, Hep B SAB, Hep A Ab, Hep C Ab,Smooth Muscle Antibody, Transferrin Saturation, SPEP, AMA, JEROD, Ceruloplasmin, Ferritin, Alpha-1-antitrypsin, CMV, 89yMale pmh HTN, BPH s/p cardiac arrest s/p 4 rounds CPR with ROSC achieved. Patient intubated on pressors. GI Consulted for elevated LFTs.    Problem 1-Elevated LFTs likely ischemic hepatitis   Rec  -Ultrasound abdomen   -Trend LFTs daily  -Can Check Hepatitis B Sag, Hep B SAB, Hep A Ab, Hep C Ab,Smooth Muscle Antibody, Transferrin Saturation, SPEP, AMA, JEROD, Ceruloplasmin, Ferritin, Alpha-1-antitrypsin, CMV,     Problem 2-One episode of dark blood in stool  Rec  -Maintain Hemodynamic Stability   -Monitor CBC  -CMP,Optimize Electrolytes  -EKG, Chest-Xray   -Monitor H and H  -Transfuse prn to hgb >8  -PPI IV BID  -Monitor Vital Signs  -Monitor Stool For blood, frequency, consistency, melena  -Active Type and Screen 89yMale pmh HTN, BPH s/p cardiac arrest s/p 4 rounds CPR with ROSC achieved. Patient intubated on pressors with anoxic brain injury. GI Consulted for elevated LFTs.    Problem 1-Elevated LFTs likely ischemic hepatitis   Rec  -Ultrasound of abdomen   -Trend LFTs daily and INR daily  -Can Check Hepatitis B Sag, Hep B SAB, Hep A Ab, Hep C Ab,Smooth Muscle Antibody, Transferrin Saturation, SPEP, AMA, JEROD, Ceruloplasmin, Ferritin, Alpha-1-antitrypsin, CMV,     Problem 2-One episode of dark blood in stool  Rec  -Maintain Hemodynamic Stability   -Monitor CBC  -CMP,Optimize Electrolytes  -Monitor H and H  -Transfuse prn to hgb >8  -PPI IV BID  -Monitor Vital Signs  -Monitor Stool For blood, frequency, consistency, melena  -Active Type and Screen 89yMale pmh HTN, BPH s/p cardiac arrest s/p 4 rounds CPR with ROSC achieved. Patient intubated on pressors with anoxic brain injury. GI Consulted for elevated LFTs.    Problem 1-Elevated LFTs likely ischemic hepatitis   Rec  -Ultrasound of abdomen   -Trend LFTs daily and INR daily  -Can Check Hepatitis B Sag, Hep B SAB, Hep A Ab, Hep C Ab,Smooth Muscle Antibody, Transferrin Saturation, SPEP, AMA, JEROD, Ceruloplasmin, Ferritin, Alpha-1-antitrypsin, CMV,     Problem 2-One episode of dark blood in stool; differential includes stress induced erosive gastritis vs ischemic colitis  Rec  -Maintain Hemodynamic Stability   -Monitor CBC  -CMP,Optimize Electrolytes  -Monitor H and H  -Transfuse prn to hgb >8  -PPI IV BID  -Monitor Vital Signs  -Monitor Stool For blood, frequency, consistency, melena  -Active Type and Screen

## 2019-10-21 NOTE — PROGRESS NOTE ADULT - SUBJECTIVE AND OBJECTIVE BOX
HPI:  88yo M w/ PMH of HTN and BPH presents to ED s/p cardiac arrest. As per son at bedside, pt had generalized weakness x 1 week. Associated w/ non-productive cough and poor PO intake. No fever noted. Pt was sitting and eating dinner at table, suddenly lost consciousness and stopped breathing. Son called EMS, EMS came within few minutes, found asystole on pt, started ACLS. Pt had ROSC after 4 rounds of epi. Pt intubated on the field. In ED, pt was found to be hypotensive and hypothermic. Central line placed in, pressors started, and received IV abx. As per ED attending, dark bloody stool noted. Hgb 11.4. Repeat CBC shows Hgb 10. (20 Oct 2019 20:15)        INTERVAL HPI/OVERNIGHT EVENTS: NO OVERNIGHT EVENTS   ICU Vital Signs Last 24 Hrs  T(C): 37.9 (21 Oct 2019 11:00), Max: 37.9 (21 Oct 2019 11:00)  T(F): 100.2 (21 Oct 2019 11:00), Max: 100.2 (21 Oct 2019 11:00)  HR: 62 (21 Oct 2019 10:01) (55 - 103)  BP: 115/56 (21 Oct 2019 10:01) (74/41 - 172/97)  BP(mean): 81 (21 Oct 2019 10:01) (66 - 129)  ABP: --  ABP(mean): --  RR: 23 (21 Oct 2019 11:00) (8 - 23)  SpO2: 98% (21 Oct 2019 10:01) (93% - 100%)    I&O's Summary    20 Oct 2019 07:01  -  21 Oct 2019 07:00  --------------------------------------------------------  IN: 1451 mL / OUT: 130 mL / NET: 1321 mL    21 Oct 2019 07:01  -  21 Oct 2019 12:45  --------------------------------------------------------  IN: 460 mL / OUT: 25 mL / NET: 435 mL      Mode: Auto Mode: PRVC/ Volume Support  RR (machine): 20  TV (machine): 450  FiO2: 60  PEEP: 8  MAP: 12  PIP: 18      LABS:                        10.4   12.00 )-----------( 185      ( 21 Oct 2019 06:15 )             33.2     10-21    137  |  101  |  62<HH>  ----------------------------<  121<H>  6.2<HH>   |  27  |  1.8<H>    Ca    8.0<L>      21 Oct 2019 06:15  Phos  5.3     10-21  Mg     2.2     10-21    TPro  4.5<L>  /  Alb  2.7<L>  /  TBili  0.9  /  DBili  x   /  AST  811<H>  /  ALT  1168<H>  /  AlkPhos  57  10-21    PT/INR - ( 20 Oct 2019 18:20 )   PT: 16.20 sec;   INR: 1.41 ratio         PTT - ( 20 Oct 2019 18:20 )  PTT:33.8 sec  Urinalysis Basic - ( 21 Oct 2019 12:27 )    Color: Yellow / Appearance: Clear / SG: >=1.030 / pH: x  Gluc: x / Ketone: Trace  / Bili: Small / Urobili: 0.2 mg/dL   Blood: x / Protein: >=300 mg/dL / Nitrite: Negative   Leuk Esterase: Small / RBC: x / WBC x   Sq Epi: x / Non Sq Epi: x / Bacteria: x      CAPILLARY BLOOD GLUCOSE      POCT Blood Glucose.: 198 mg/dL (20 Oct 2019 18:08)    ABG - ( 21 Oct 2019 05:36 )  pH, Arterial: 7.36  pH, Blood: x     /  pCO2: 45    /  pO2: 79    / HCO3: 26    / Base Excess: -0.1  /  SaO2: 96                  RADIOLOGY & ADDITIONAL TESTS:    Consultant(s) Notes Reviewed:  [x ] YES  [ ] NO    MEDICATIONS  (STANDING):  chlorhexidine 0.12% Liquid 15 milliLiter(s) Oral Mucosa two times a day  chlorhexidine 4% Liquid 1 Application(s) Topical two times a day  clindamycin IVPB      clindamycin IVPB 600 milliGRAM(s) IV Intermittent once  clindamycin IVPB 600 milliGRAM(s) IV Intermittent every 8 hours  fentaNYL   Infusion. 0.5 MICROgram(s)/kG/Hr (2.727 mL/Hr) IV Continuous <Continuous>  influenza   Vaccine 0.5 milliLiter(s) IntraMuscular once  norepinephrine Infusion 0.05 MICROgram(s)/kG/Min (5.113 mL/Hr) IV Continuous <Continuous>  pantoprazole  Injectable 40 milliGRAM(s) IV Push daily  sodium chloride 0.9%. 1000 milliLiter(s) (150 mL/Hr) IV Continuous <Continuous>  tamsulosin 0.4 milliGRAM(s) Oral at bedtime    MEDICATIONS  (PRN):      PHYSICAL EXAM:  GENERAL: NO ACUTE DISTRESS PATIENT IS INTUBATED AND SEDATED   HEAD:  Atraumatic, Normocephalic  EYES: EOMI, PERRLA, conjunctiva and sclera clear  ENT: No tonsillar erythema, exudates, or enlargement; Moist mucous membranes, Good dentition, No lesions  NECK: Supple, No JVD, Normal thyroid, no enlarged nodes  NERVOUS SYSTEM: PATIENT IS INTUBATED AND SEDATED.   CHEST/LUNG: B/L good air entry; No rales, rhonchi, or wheezing  HEART: S1S2 normal, no S3, Regular rate and rhythm; No murmurs, rubs, or gallops  ABDOMEN: Soft, Nontender, Nondistended; Bowel sounds present  EXTREMITIES:  2+ Peripheral Pulses, No clubbing, cyanosis, or edema  LYMPH: No lymphadenopathy noted  SKIN: No rashes or lesions    Care Discussed with Consultants/Other Providers [ x] YES  [ ] NO

## 2019-10-21 NOTE — CONSULT NOTE ADULT - ASSESSMENT
IMPRESSION:  ARF s/p cardiac arrest ?? arrythmia hyperkalemia   GERRY   Multiorgan failure   liver failure shock   hyperkalemia   ?? asp PNA RLL     PLAN:    CNS: sedation vacation , EEG if not awake do ct head and neurology consult     HEENT: oral care     PULMONARY: pull et tube 2 cm out then repeat cxr   increase peep to 8     CARDIOVASCULAR: cardiology consult   echo   taper pressors   keep IS = OS might need lasix   apply CHEETAH     GI: GI prophylaxis.  NG Feeding if H/H stable      RENAL: renal consult stat for hyperkalemia   give stat  insulin / dextrose repeat K after 1 hr     INFECTIOUS DISEASE: start Unasyn   follow cx   DTA     HEMATOLOGICAL:  DVT prophylaxis.start heparin sq if h/h stable after noon   seriel cbc     ENDOCRINE:  Follow up FS.  Insulin protocol if needed.  guarded prognosis         CRITICAL CARE TIME SPENT: *** IMPRESSION:  ARF s/p cardiac arrest ?? arrythmia hyperkalemia doubt PNA or sepsis cause of cardiac arrest    GERRY   Multiorgan failure   liver failure shock   hyperkalemia   ?? asp PNA RLL     PLAN:    CNS: sedation vacation , EEG if not awake do ct head and neurology consult     HEENT: oral care     PULMONARY: pull et tube 2 cm out then repeat cxr   increase peep to 8     CARDIOVASCULAR: cardiology consult   echo   taper pressors   keep IS = OS might need lasix   apply CHEETAH     GI: GI prophylaxis.  NG Feeding if H/H stable if ok by GI   on PPI   do RUO sono follow LFT   GI consult     RENAL: renal consult stat for hyperkalemia   give stat  insulin / dextrose repeat K after 1 hr   EKG  now   INFECTIOUS DISEASE: start Unasyn   follow cx   DTA     HEMATOLOGICAL:  DVT prophylaxis.start heparin sq if h/h stable after noon   seriel cbc   do doppler lower ext stat     ENDOCRINE:  Follow up FS.  Insulin protocol if needed.  guarded prognosis         CRITICAL CARE TIME SPENT: ***

## 2019-10-21 NOTE — CONSULT NOTE ADULT - SUBJECTIVE AND OBJECTIVE BOX
CARDIOLOGY CONSULT NOTE     CHIEF COMPLAINT/REASON FOR CONSULT:    HPI:  88yo M w/ PMH of HTN and BPH presents to ED s/p cardiac arrest. As per son at bedside, pt had generalized weakness x 1 week. Associated w/ non-productive cough and poor PO intake. No fever noted. Pt was sitting and eating dinner at table, suddenly lost consciousness and stopped breathing. Son called EMS, EMS came within few minutes, found asystole on pt, started ACLS. Pt had ROSC after 4 rounds of epi. Pt intubated on the field. In ED, pt was found to be hypotensive and hypothermic. Central line placed in, pressors started, and received IV abx. As per ED attending, dark bloody stool noted. Hgb 11.4. Repeat CBC shows Hgb 10. (20 Oct 2019 20:15)      PAST MEDICAL & SURGICAL HISTORY:  Enlarged prostate  Hypertension  S/P TURP      Cardiac Risks:   [x ]HTN, [ ] DM, [ ] Smoking, [ ] FH,  [ ] Lipids        MEDICATIONS:  MEDICATIONS  (STANDING):  chlorhexidine 0.12% Liquid 15 milliLiter(s) Oral Mucosa two times a day  chlorhexidine 4% Liquid 1 Application(s) Topical two times a day  fentaNYL   Infusion. 0.5 MICROgram(s)/kG/Hr (2.727 mL/Hr) IV Continuous <Continuous>  influenza   Vaccine 0.5 milliLiter(s) IntraMuscular once  norepinephrine Infusion 0.05 MICROgram(s)/kG/Min (5.113 mL/Hr) IV Continuous <Continuous>  pantoprazole  Injectable 40 milliGRAM(s) IV Push daily  sodium chloride 0.9%. 1000 milliLiter(s) (50 mL/Hr) IV Continuous <Continuous>  tamsulosin 0.4 milliGRAM(s) Oral at bedtime      FAMILY HISTORY:  No pertinent family history in first degree relatives      SOCIAL HISTORY:      [ ] Marital status   Allergies    penicillin (Short breath)      	    REVIEW OF SYSTEMS:  CONSTITUTIONAL: No fever, weight loss, or fatigue  EYES: No eye pain, visual disturbances, or discharge  ENMT:  No difficulty hearing, tinnitus, vertigo; No sinus or throat pain  NECK: No pain or stiffness  RESPIRATORY: No cough, wheezing, chills or hemoptysis; No Shortness of Breath  CARDIOVASCULAR: No chest pain, palpitations, passing out, dizziness, or leg swelling  GASTROINTESTINAL: No abdominal or epigastric pain. No nausea, vomiting, or hematemesis; No diarrhea or constipation. No melena or hematochezia.  GENITOURINARY: No dysuria, frequency, hematuria, or incontinence  NEUROLOGICAL: No headaches, memory loss, loss of strength, numbness, or tremors  SKIN: No itching, burning, rashes, or lesions       PHYSICAL EXAM:  T(C): 36.6 (10-21-19 @ 03:11), Max: 36.6 (10-21-19 @ 03:11)  HR: 55 (10-21-19 @ 05:25) (55 - 103)  BP: 125/46 (10-21-19 @ 05:25) (74/41 - 172/97)  RR: 20 (10-20-19 @ 22:45) (8 - 20)  SpO2: 98% (10-21-19 @ 05:25) (93% - 100%)  Wt(kg): --  I&O's Summary    20 Oct 2019 07:01  -  21 Oct 2019 07:00  --------------------------------------------------------  IN: 1451 mL / OUT: 130 mL / NET: 1321 mL        Appearance: Normal	  Psychiatry: A & O x 3, Mood & affect appropriate  HEENT:   Normal oral mucosa, PERRL, EOMI	  Lymphatic: No lymphadenopathy  Cardiovascular: Normal S1 S2,RRR, No JVD, No murmurs  Respiratory: Lungs clear to auscultation	  Gastrointestinal:  Soft, Non-tender, + BS	  Skin: No rashes, No ecchymoses, No cyanosis	  Neurologic: Non-focal  Extremities: Normal range of motion, No clubbing, cyanosis or edema  Vascular: Peripheral pulses palpable 2+ bilaterally      ECG:  	afib ns st    	  LABS:	 	    CARDIAC MARKERS:          Serum Pro-Brain Natriuretic Peptide: 4524 pg/mL (10-20 @ 18:20)                            10.4   12.00 )-----------( 185      ( 21 Oct 2019 06:15 )             33.2     10-21    136  |  99  |  59<H>  ----------------------------<  182<H>  5.5<H>   |  26  |  1.7<H>    Ca    8.1<L>      21 Oct 2019 00:35  Mg     2.7     10-20    TPro  4.3<L>  /  Alb  2.7<L>  /  TBili  1.2  /  DBili  x   /  AST  858<H>  /  ALT  1243<H>  /  AlkPhos  60  10-20    PT/INR - ( 20 Oct 2019 18:20 )   PT: 16.20 sec;   INR: 1.41 ratio         PTT - ( 20 Oct 2019 18:20 )  PTT:33.8 sec  proBNP: Serum Pro-Brain Natriuretic Peptide: 4524 pg/mL (10-20 @ 18:20)

## 2019-10-21 NOTE — GOALS OF CARE CONVERSATION - ADVANCED CARE PLANNING - CONVERSATION DETAILS
Prognossis discussed with Son Rosendo Camargo. Likely poor prognosis. MOLST form discussed, son decided to make patient DNR. Not DNI as patient is currently intubated. Discussed with Dr. Paris.

## 2019-10-21 NOTE — PROGRESS NOTE ADULT - ASSESSMENT
88yo M w/ PMH of HTN and BPH presents to ED s/p cardiac arrest. Pt was sitting and eating dinner at table, suddenly lost consciousness and stopped breathing. Son called EMS, EMS came within few minutes, found asystole on pt, started ACLS. Pt had ROSC after 4 rounds of epi. Pt intubated on the field. As per ED attending, dark bloody stool noted. Hgb 11.4. Repeat CBC shows Hgb 10.           Cardiac arrest  - unknown etiology for now.  - PE can't be r/o. Pt unstable to get CTA chest for now, will get CTA chest when pt is more stable  - no heparin drip for now due to possible GI bleed. Once GI bleed ruled out will consider anticoagulation   - c/w pressors as needed  - NPO  - IV Fluids increased to 150/hr as patient was fluid responsive.   - cardiology consult  - neurology consult to r/o anoxic brain injury    Upper GI bleed  - Hgb 11.4 --> 10  - Serial CBCs  - transfuse as needed   - Will hold AC  - SCDs for DVT prophylaxis    Hyperkalemia  - Will repeat BMP, and treat accordingly  - BMP in am, f/u lytes    HTN  - hold anti-htn meds for now due to hypotension      DVT prophylaxis: SCDs  GI prophylaxis: PPI  Diet: NPO  Code status: Full Code 89yo M w/ PMH of HTN and BPH presents to ED s/p cardiac arrest. Pt was sitting and eating dinner at table, suddenly lost consciousness and stopped breathing. Son called EMS, EMS came within few minutes, found asystole on pt, started ACLS. Pt had ROSC after 4 rounds of epi. Pt intubated on the field. As per ED attending, dark bloody stool noted. Hgb 11.4. Repeat CBC shows Hgb 10.       Cardiac arrest  - unknown etiology for now.  - PE can't be r/o. Pt unstable to get CTA chest for now, will get CTA chest when pt is more stable  - no heparin drip for now due to possible GI bleed. Once GI bleed ruled out will consider anticoagulation   - c/w pressors as needed  - NPO  - IV Fluids increased to 150/hr as patient was fluid responsive.   - cardiology consult  - neurology consult to r/o anoxic brain injury    Upper GI bleed  - Hgb 11.4 --> 10  - Serial CBCs  - transfuse as needed   - Will hold AC  - SCDs for DVT prophylaxis    Hyperkalemia  - Will repeat BMP, and treat accordingly  - BMP in am, f/u lytes    HTN  - hold anti-htn meds for now due to hypotension      DVT prophylaxis: SCDs  GI prophylaxis: PPI  Diet: NPO  Code status: Full Code

## 2019-10-21 NOTE — CONSULT NOTE ADULT - SUBJECTIVE AND OBJECTIVE BOX
Patient is a 89y old  Male who presents with a chief complaint of cardiac arrest (21 Oct 2019 07:16)      HPI:  88yo M w/ PMH of HTN and BPH presents to ED s/p cardiac arrest. As per son at bedside, pt had generalized weakness x 1 week. Associated w/ non-productive cough and poor PO intake. No fever noted. Pt was sitting and eating dinner at table, suddenly lost consciousness and stopped breathing. Son called EMS, EMS came within few minutes, found asystole on pt, started ACLS. Pt had ROSC after 4 rounds of epi. Pt intubated on the field. In ED, pt was found to be hypotensive and hypothermic. Central line placed in, pressors started, and received IV abx. As per ED attending, dark bloody stool noted. Hgb 11.4. Repeat CBC shows Hgb 10. (20 Oct 2019 20:15)  now on sedation and pressors      PAST MEDICAL & SURGICAL HISTORY:  Enlarged prostate  Hypertension  S/P TURP    Allergies    penicillin (Short breath)    Intolerances      Family history : no cardiovscular family history   Home Medications:  atenolol 100 mg oral tablet: 1 tab(s) orally once a day (20 Oct 2019 20:52)  Flomax 0.4 mg oral capsule: 1 cap(s) orally once a day (20 Oct 2019 20:52)    Occupation:  Alochol: Denied  Smoking: Denied  Drug Use: Denied  Marital Status:         ROS: as in HPI; All other systems reviewed are negative    ICU Vital Signs Last 24 Hrs  T(C): 37.3 (21 Oct 2019 07:10), Max: 37.3 (21 Oct 2019 07:10)  T(F): 99.1 (21 Oct 2019 07:10), Max: 99.1 (21 Oct 2019 07:10)  HR: 61 (21 Oct 2019 08:31) (55 - 103)  BP: 112/51 (21 Oct 2019 08:31) (74/41 - 172/97)  BP(mean): 74 (21 Oct 2019 08:31) (66 - 129)  ABP: --  ABP(mean): --  RR: 20 (21 Oct 2019 07:10) (8 - 20)  SpO2: 97% (21 Oct 2019 08:31) (93% - 100%)        Physical Examination:    General: on sedation     HEENT: Pupils equal, reactive to light.  Symmetric. ET tube     PULM: b/l crackles     CVS: Regular rate and rhythm, no murmurs, rubs, or gallops    ABD: Soft, nondistended, nontender, normoactive bowel sounds, no masses    EXT: No edema, nontender, no clubbing     SKIN: Warm and well perfused, no rashes noted.    Neurology : no motor or sensory deficit     Musculoskeletal : move all extremity     Lymphatic system: no Palpable node       Mode: Auto Mode: PRVC/ Volume Support  RR (machine): 20  TV (machine): 450  FiO2: 60  PEEP: 5  MAP: 9  PIP: 19      ABG - ( 21 Oct 2019 05:36 )  pH, Arterial: 7.36  pH, Blood: x     /  pCO2: 45    /  pO2: 79    / HCO3: 26    / Base Excess: -0.1  /  SaO2: 96                  I&O's Detail    20 Oct 2019 07:01  -  21 Oct 2019 07:00  --------------------------------------------------------  IN:    fentaNYL Infusion.: 30 mL    IV PiggyBack: 500 mL    norepinephrine Infusion: 546 mL    sodium chloride 0.9%.: 375 mL  Total IN: 1451 mL    OUT:    Indwelling Catheter - Suprapubic: 128 mL    Stool: 2 mL  Total OUT: 130 mL    Total NET: 1321 mL      21 Oct 2019 07:01  -  21 Oct 2019 08:57  --------------------------------------------------------  IN:  Total IN: 0 mL    OUT:    Indwelling Catheter - Suprapubic: 5 mL  Total OUT: 5 mL    Total NET: -5 mL            LABS:                        10.4   12.00 )-----------( 185      ( 21 Oct 2019 06:15 )             33.2     21 Oct 2019 06:15    137    |  101    |  62     ----------------------------<  121    6.2     |  27     |  1.8      Ca    8.0        21 Oct 2019 06:15  Phos  5.3       21 Oct 2019 06:15  Mg     2.2       21 Oct 2019 06:15    TPro  4.5    /  Alb  2.7    /  TBili  0.9    /  DBili  x      /  AST  811    /  ALT  1168   /  AlkPhos  57     21 Oct 2019 06:15  Amylase x     lipase x          CARDIAC MARKERS ( 21 Oct 2019 06:15 )  x     / 0.07 ng/mL / 205 U/L / x     / 11.6 ng/mL  CARDIAC MARKERS ( 20 Oct 2019 18:20 )  x     / 0.03 ng/mL / 109 U/L / x     / x          CAPILLARY BLOOD GLUCOSE      POCT Blood Glucose.: 198 mg/dL (20 Oct 2019 18:08)    PT/INR - ( 20 Oct 2019 18:20 )   PT: 16.20 sec;   INR: 1.41 ratio         PTT - ( 20 Oct 2019 18:20 )  PTT:33.8 sec    Culture    Lactate, Blood: 2.7 mmol/L (10-21-19 @ 06:15)  Lactate, Blood: 7.0 mmol/L (10-20-19 @ 18:20)      MEDICATIONS  (STANDING):  chlorhexidine 0.12% Liquid 15 milliLiter(s) Oral Mucosa two times a day  chlorhexidine 4% Liquid 1 Application(s) Topical two times a day  fentaNYL   Infusion. 0.5 MICROgram(s)/kG/Hr (2.727 mL/Hr) IV Continuous <Continuous>  influenza   Vaccine 0.5 milliLiter(s) IntraMuscular once  norepinephrine Infusion 0.05 MICROgram(s)/kG/Min (5.113 mL/Hr) IV Continuous <Continuous>  pantoprazole  Injectable 40 milliGRAM(s) IV Push daily  sodium chloride 0.9%. 1000 milliLiter(s) (50 mL/Hr) IV Continuous <Continuous>  tamsulosin 0.4 milliGRAM(s) Oral at bedtime    MEDICATIONS  (PRN):        RADIOLOGY: ***     CXR: b/l effusion RLL opacity ?? reviewed et tube deep   TLC:  OG:  ET tube:        CAM ICU:  ECHO: Patient is a 89y old  Male who presents with a chief complaint of cardiac arrest (21 Oct 2019 07:16)      HPI:  89yo M w/ PMH of HTN and BPH presents to ED s/p cardiac arrest. As per son at bedside, pt had generalized weakness x 1 week. Associated w/ non-productive cough and poor PO intake. No fever noted. Pt was sitting and eating dinner at table, suddenly lost consciousness and stopped breathing. Son called EMS, EMS came within few minutes, found asystole on pt, started ACLS. Pt had ROSC after 4 rounds of epi. Pt intubated on the field. In ED, pt was found to be hypotensive and hypothermic. Central line placed in, pressors started, and received IV abx. As per ED attending, dark bloody stool noted. Hgb 11.4. Repeat CBC shows Hgb 10. (20 Oct 2019 20:15)  now on sedation and pressors      PAST MEDICAL & SURGICAL HISTORY:  Enlarged prostate  Hypertension  S/P TURP    Allergies    penicillin (Short breath)    Intolerances      Family history : no cardiovscular family history   Home Medications:  atenolol 100 mg oral tablet: 1 tab(s) orally once a day (20 Oct 2019 20:52)  Flomax 0.4 mg oral capsule: 1 cap(s) orally once a day (20 Oct 2019 20:52)    Occupation:  Alochol: Denied  Smoking: Denied  Drug Use: Denied  Marital Status:         ROS: as in HPI; All other systems reviewed are negative    ICU Vital Signs Last 24 Hrs  T(C): 37.3 (21 Oct 2019 07:10), Max: 37.3 (21 Oct 2019 07:10)  T(F): 99.1 (21 Oct 2019 07:10), Max: 99.1 (21 Oct 2019 07:10)  HR: 61 (21 Oct 2019 08:31) (55 - 103)  BP: 112/51 (21 Oct 2019 08:31) (74/41 - 172/97)  BP(mean): 74 (21 Oct 2019 08:31) (66 - 129)  ABP: --  ABP(mean): --  RR: 20 (21 Oct 2019 07:10) (8 - 20)  SpO2: 97% (21 Oct 2019 08:31) (93% - 100%)        Physical Examination:    General: on sedation     HEENT: Pupils equal, reactive to light.  Symmetric. ET tube     PULM: b/l crackles     CVS: Regular rate and rhythm, no murmurs, rubs, or gallops    ABD: Soft, nondistended, nontender, normoactive bowel sounds, no masses    EXT: No edema, nontender, no clubbing     SKIN: Warm and well perfused, no rashes noted.    Neurology : no motor or sensory deficit     Musculoskeletal :     Lymphatic system: no Palpable node       Mode: Auto Mode: PRVC/ Volume Support  RR (machine): 20  TV (machine): 450  FiO2: 60  PEEP: 5  MAP: 9  PIP: 19      ABG - ( 21 Oct 2019 05:36 )  pH, Arterial: 7.36  pH, Blood: x     /  pCO2: 45    /  pO2: 79    / HCO3: 26    / Base Excess: -0.1  /  SaO2: 96                  I&O's Detail    20 Oct 2019 07:01  -  21 Oct 2019 07:00  --------------------------------------------------------  IN:    fentaNYL Infusion.: 30 mL    IV PiggyBack: 500 mL    norepinephrine Infusion: 546 mL    sodium chloride 0.9%.: 375 mL  Total IN: 1451 mL    OUT:    Indwelling Catheter - Suprapubic: 128 mL    Stool: 2 mL  Total OUT: 130 mL    Total NET: 1321 mL      21 Oct 2019 07:01  -  21 Oct 2019 08:57  --------------------------------------------------------  IN:  Total IN: 0 mL    OUT:    Indwelling Catheter - Suprapubic: 5 mL  Total OUT: 5 mL    Total NET: -5 mL            LABS:                        10.4   12.00 )-----------( 185      ( 21 Oct 2019 06:15 )             33.2     21 Oct 2019 06:15    137    |  101    |  62     ----------------------------<  121    6.2     |  27     |  1.8      Ca    8.0        21 Oct 2019 06:15  Phos  5.3       21 Oct 2019 06:15  Mg     2.2       21 Oct 2019 06:15    TPro  4.5    /  Alb  2.7    /  TBili  0.9    /  DBili  x      /  AST  811    /  ALT  1168   /  AlkPhos  57     21 Oct 2019 06:15  Amylase x     lipase x          CARDIAC MARKERS ( 21 Oct 2019 06:15 )  x     / 0.07 ng/mL / 205 U/L / x     / 11.6 ng/mL  CARDIAC MARKERS ( 20 Oct 2019 18:20 )  x     / 0.03 ng/mL / 109 U/L / x     / x          CAPILLARY BLOOD GLUCOSE      POCT Blood Glucose.: 198 mg/dL (20 Oct 2019 18:08)    PT/INR - ( 20 Oct 2019 18:20 )   PT: 16.20 sec;   INR: 1.41 ratio         PTT - ( 20 Oct 2019 18:20 )  PTT:33.8 sec    Culture    Lactate, Blood: 2.7 mmol/L (10-21-19 @ 06:15)  Lactate, Blood: 7.0 mmol/L (10-20-19 @ 18:20)      MEDICATIONS  (STANDING):  chlorhexidine 0.12% Liquid 15 milliLiter(s) Oral Mucosa two times a day  chlorhexidine 4% Liquid 1 Application(s) Topical two times a day  fentaNYL   Infusion. 0.5 MICROgram(s)/kG/Hr (2.727 mL/Hr) IV Continuous <Continuous>  influenza   Vaccine 0.5 milliLiter(s) IntraMuscular once  norepinephrine Infusion 0.05 MICROgram(s)/kG/Min (5.113 mL/Hr) IV Continuous <Continuous>  pantoprazole  Injectable 40 milliGRAM(s) IV Push daily  sodium chloride 0.9%. 1000 milliLiter(s) (50 mL/Hr) IV Continuous <Continuous>  tamsulosin 0.4 milliGRAM(s) Oral at bedtime    MEDICATIONS  (PRN):        RADIOLOGY: ***     CXR: b/l effusion RLL opacity ?? reviewed et tube deep   TLC:  OG:  ET tube:        CAM ICU:  ECHO:

## 2019-10-21 NOTE — CONSULT NOTE ADULT - SUBJECTIVE AND OBJECTIVE BOX
Chief complaint/Reason for consult: Elevated LFTs     HPI:  88yo M w/ PMH of HTN and BPH presents to ED s/p cardiac arrest. As per son at bedside, pt had generalized weakness x 1 week. Associated w/ non-productive cough and poor PO intake. No fever noted. Pt was sitting and eating dinner at table, suddenly lost consciousness and stopped breathing. Son called EMS, EMS came within few minutes, found asystole on pt, started ACLS. Pt had ROSC after 4 rounds of epi. Pt intubated on the field. In ED, pt was found to be hypotensive and hypothermic. Central line placed in, pressors started, and received IV abx. As per ED attending, dark bloody stool noted. Hgb 11.4. Repeat CBC shows Hgb 10. (20 Oct 2019 20:15)    GI updates-89yMale pmh HTN, BPH s/p cardiac arrest s/p 4 rounds CPR with ROSC achieved. Patient intubated on pressors. GI Consulted for elevated LFTs.      PAST MEDICAL & SURGICAL HISTORY:   Enlarged prostate  Hypertension  S/P TURP        Family history:  FAMILY HISTORY:  No pertinent family history in first degree relatives    No GI cancers in first or second degree relatives    Social History: No smoking. No alcohol. No illegal drug use.    Allergies:     penicillin (Short breath)                MEDICATIONS: Home Medications:  atenolol 100 mg oral tablet: 1 tab(s) orally once a day (20 Oct 2019 20:52)  Flomax 0.4 mg oral capsule: 1 cap(s) orally once a day (20 Oct 2019 20:52)      MEDICATIONS  (STANDING):  chlorhexidine 0.12% Liquid 15 milliLiter(s) Oral Mucosa two times a day  chlorhexidine 4% Liquid 1 Application(s) Topical two times a day  clindamycin IVPB      clindamycin IVPB 600 milliGRAM(s) IV Intermittent once  clindamycin IVPB 600 milliGRAM(s) IV Intermittent every 8 hours  dextrose 50% Injectable 50 milliLiter(s) IV Push once  fentaNYL   Infusion. 0.5 MICROgram(s)/kG/Hr (2.727 mL/Hr) IV Continuous <Continuous>  influenza   Vaccine 0.5 milliLiter(s) IntraMuscular once  insulin regular  human recombinant. 10 Unit(s) IV Push once  norepinephrine Infusion 0.05 MICROgram(s)/kG/Min (5.113 mL/Hr) IV Continuous <Continuous>  pantoprazole  Injectable 40 milliGRAM(s) IV Push daily  sodium chloride 0.9%. 1000 milliLiter(s) (150 mL/Hr) IV Continuous <Continuous>  tamsulosin 0.4 milliGRAM(s) Oral at bedtime          REVIEW OF SYSTEMS  Unobtainable       VITALS:   T(F): 100.2 (10-21-19 @ 11:00), Max: 100.2 (10-21-19 @ 11:00)  HR: 62 (10-21-19 @ 10:01) (55 - 103)  BP: 115/56 (10-21-19 @ 10:01) (74/41 - 172/97)  RR: 23 (10-21-19 @ 11:00) (8 - 23)  SpO2: 98% (10-21-19 @ 10:01) (93% - 100%)    PHYSICAL EXAM:  GENERAL: unresponsive +intubated  HEAD:  Atraumatic, Normocephalic  EYES: conjunctiva and sclera clear  NECK: Supple, No thyromegaly   CHEST/LUNG: +rhonchi b/l  HEART: Regular rate and rhythm; normal S1, S2, No murmurs.  ABDOMEN: Soft, nondistended; Bowel sounds present, no abdominal bruit, masses, or hepatosplenomegaly  SKIN: Intact, no jaundice          LABS:  10-21    137  |  101  |  62<HH>  ----------------------------<  121<H>  6.2<HH>   |  27  |  1.8<H>    Ca    8.0<L>      21 Oct 2019 06:15  Phos  5.3     10-21  Mg     2.2     10-21    TPro  4.5<L>  /  Alb  2.7<L>  /  TBili  0.9  /  DBili  x   /  AST  811<H>  /  ALT  1168<H>  /  AlkPhos  57  10-21                          10.4   12.00 )-----------( 185      ( 21 Oct 2019 06:15 )             33.2     LIVER FUNCTIONS - ( 21 Oct 2019 06:15 )  Alb: 2.7 g/dL / Pro: 4.5 g/dL / ALK PHOS: 57 U/L / ALT: 1168 U/L / AST: 811 U/L / GGT: x           PT/INR - ( 20 Oct 2019 18:20 )   PT: 16.20 sec;   INR: 1.41 ratio         PTT - ( 20 Oct 2019 18:20 )  PTT:33.8 sec    IMAGING:    < from: Xray Chest 1 View-PORTABLE IMMEDIATE (10.21.19 @ 11:58) >  EXAM:  XR CHEST PORTABLE IMMED 1V            PROCEDURE DATE:  10/21/2019            INTERPRETATION:  Clinical History / Reason for exam: Tube placement    Comparison : Chest radiograph 2019 time 4:40 AM.    Technique/Positionin frontal views.    Findings:    Support devices: Repositioned endotracheal tube above the yuriy.. Stable   position NG tube in the distal esophagus..    Cardiac/mediastinum/hilum: Heart size within normal limits, thoracic   aortic calcification..    Lung parenchyma/Pleura: Bilateral opacities/pleural effusions, unchanged..    Skeleton/soft tissues: Stable.    Impression:      Bilateral opacities/pleural effusions, unchanged. Support devices as   described.                      DIANA ACOSTA M.D., ATTENDING RADIOLOGIST  This document has been electronically signed. Oct 21 2019 12:07PM              < end of copied text > Chief complaint/Reason for consult: Elevated LFTs     HPI:  89yo M w/ PMH of HTN and BPH presents to ED s/p cardiac arrest. As per son at bedside, pt had generalized weakness x 1 week. Associated w/ non-productive cough and poor PO intake. No fever noted. Pt was sitting and eating dinner at table, suddenly lost consciousness and stopped breathing. Son called EMS, EMS came within few minutes, found asystole on pt, started ACLS. Pt had ROSC after 4 rounds of epi. Pt intubated on the field. In ED, pt was found to be hypotensive and hypothermic. Central line placed in, pressors started, and received IV abx. As per ED attending, dark bloody stool noted. Hgb 11.4. Repeat CBC shows Hgb 10. (20 Oct 2019 20:15)    GI updates-89yMale pmh HTN, BPH s/p cardiac arrest s/p 4 rounds CPR with ROSC achieved. Patient intubated on pressors. GI Consulted for elevated LFTs.      PAST MEDICAL & SURGICAL HISTORY:   Enlarged prostate  Hypertension  S/P TURP        Family history:  FAMILY HISTORY:  No pertinent family history in first degree relatives    No GI cancers in first or second degree relatives    Social History: No smoking. No alcohol. No illegal drug use.    Allergies:     penicillin (Short breath)                MEDICATIONS: Home Medications:  atenolol 100 mg oral tablet: 1 tab(s) orally once a day (20 Oct 2019 20:52)  Flomax 0.4 mg oral capsule: 1 cap(s) orally once a day (20 Oct 2019 20:52)      MEDICATIONS  (STANDING):  chlorhexidine 0.12% Liquid 15 milliLiter(s) Oral Mucosa two times a day  chlorhexidine 4% Liquid 1 Application(s) Topical two times a day  clindamycin IVPB      clindamycin IVPB 600 milliGRAM(s) IV Intermittent once  clindamycin IVPB 600 milliGRAM(s) IV Intermittent every 8 hours  dextrose 50% Injectable 50 milliLiter(s) IV Push once  fentaNYL   Infusion. 0.5 MICROgram(s)/kG/Hr (2.727 mL/Hr) IV Continuous <Continuous>  influenza   Vaccine 0.5 milliLiter(s) IntraMuscular once  insulin regular  human recombinant. 10 Unit(s) IV Push once  norepinephrine Infusion 0.05 MICROgram(s)/kG/Min (5.113 mL/Hr) IV Continuous <Continuous>  pantoprazole  Injectable 40 milliGRAM(s) IV Push daily  sodium chloride 0.9%. 1000 milliLiter(s) (150 mL/Hr) IV Continuous <Continuous>  tamsulosin 0.4 milliGRAM(s) Oral at bedtime          REVIEW OF SYSTEMS  Unobtainable       VITALS:   T(F): 100.2 (10-21-19 @ 11:00), Max: 100.2 (10-21-19 @ 11:00)  HR: 62 (10-21-19 @ 10:01) (55 - 103)  BP: 115/56 (10-21-19 @ 10:01) (74/41 - 172/97)  RR: 23 (10-21-19 @ 11:00) (8 - 23)  SpO2: 98% (10-21-19 @ 10:01) (93% - 100%)    PHYSICAL EXAM:  GENERAL: unresponsive +intubated  HEAD:  Atraumatic, Normocephalic  EYES: conjunctiva and sclera clear  NECK: Supple, No thyromegaly   CHEST/LUNG: +rhonchi b/l  HEART: Regular rate and rhythm; normal S1, S2, No murmurs.  ABDOMEN: Soft, nondistended; Bowel sounds present, no abdominal bruit, masses, or hepatosplenomegaly  SKIN: Intact, no jaundice  Rectal Tube-shows brown stool        LABS:  10-21    137  |  101  |  62<HH>  ----------------------------<  121<H>  6.2<HH>   |  27  |  1.8<H>    Ca    8.0<L>      21 Oct 2019 06:15  Phos  5.3     10-21  Mg     2.2     10-21    TPro  4.5<L>  /  Alb  2.7<L>  /  TBili  0.9  /  DBili  x   /  AST  811<H>  /  ALT  1168<H>  /  AlkPhos  57  10-21                          10.4   12.00 )-----------( 185      ( 21 Oct 2019 06:15 )             33.2     LIVER FUNCTIONS - ( 21 Oct 2019 06:15 )  Alb: 2.7 g/dL / Pro: 4.5 g/dL / ALK PHOS: 57 U/L / ALT: 1168 U/L / AST: 811 U/L / GGT: x           PT/INR - ( 20 Oct 2019 18:20 )   PT: 16.20 sec;   INR: 1.41 ratio         PTT - ( 20 Oct 2019 18:20 )  PTT:33.8 sec    IMAGING:    < from: Xray Chest 1 View-PORTABLE IMMEDIATE (10.21.19 @ 11:58) >  EXAM:  XR CHEST PORTABLE IMMED 1V            PROCEDURE DATE:  10/21/2019            INTERPRETATION:  Clinical History / Reason for exam: Tube placement    Comparison : Chest radiograph 2019 time 4:40 AM.    Technique/Positionin frontal views.    Findings:    Support devices: Repositioned endotracheal tube above the yuriy.. Stable   position NG tube in the distal esophagus..    Cardiac/mediastinum/hilum: Heart size within normal limits, thoracic   aortic calcification..    Lung parenchyma/Pleura: Bilateral opacities/pleural effusions, unchanged..    Skeleton/soft tissues: Stable.    Impression:      Bilateral opacities/pleural effusions, unchanged. Support devices as   described.                      DIANA ACOSTA M.D., ATTENDING RADIOLOGIST  This document has been electronically signed. Oct 21 2019 12:07PM              < end of copied text >

## 2019-10-21 NOTE — CONSULT NOTE ADULT - ASSESSMENT
Patient on vent on pressors Not responsive. Urine decreased. No definitive MI. Appears to have anoxic brain injury. Check labs. Support . Echo . DVT prophylaxis . AC if candidate. Prognosis grave . Family aware

## 2019-10-22 LAB
ALBUMIN SERPL ELPH-MCNC: 2.4 G/DL — LOW (ref 3.5–5.2)
ALBUMIN SERPL ELPH-MCNC: 2.5 G/DL — LOW (ref 3.5–5.2)
ALP SERPL-CCNC: 49 U/L — SIGNIFICANT CHANGE UP (ref 30–115)
ALP SERPL-CCNC: 52 U/L — SIGNIFICANT CHANGE UP (ref 30–115)
ALT FLD-CCNC: 696 U/L — HIGH (ref 0–41)
ALT FLD-CCNC: 813 U/L — HIGH (ref 0–41)
ANION GAP SERPL CALC-SCNC: 13 MMOL/L — SIGNIFICANT CHANGE UP (ref 7–14)
ANION GAP SERPL CALC-SCNC: 13 MMOL/L — SIGNIFICANT CHANGE UP (ref 7–14)
AST SERPL-CCNC: 159 U/L — HIGH (ref 0–41)
AST SERPL-CCNC: 205 U/L — HIGH (ref 0–41)
BASOPHILS # BLD AUTO: 0.01 K/UL — SIGNIFICANT CHANGE UP (ref 0–0.2)
BASOPHILS NFR BLD AUTO: 0.1 % — SIGNIFICANT CHANGE UP (ref 0–1)
BILIRUB SERPL-MCNC: 0.8 MG/DL — SIGNIFICANT CHANGE UP (ref 0.2–1.2)
BILIRUB SERPL-MCNC: 0.9 MG/DL — SIGNIFICANT CHANGE UP (ref 0.2–1.2)
BUN SERPL-MCNC: 68 MG/DL — CRITICAL HIGH (ref 10–20)
BUN SERPL-MCNC: 68 MG/DL — CRITICAL HIGH (ref 10–20)
CALCIUM SERPL-MCNC: 7.5 MG/DL — LOW (ref 8.5–10.1)
CALCIUM SERPL-MCNC: 7.6 MG/DL — LOW (ref 8.5–10.1)
CHLORIDE SERPL-SCNC: 105 MMOL/L — SIGNIFICANT CHANGE UP (ref 98–110)
CHLORIDE SERPL-SCNC: 106 MMOL/L — SIGNIFICANT CHANGE UP (ref 98–110)
CO2 SERPL-SCNC: 23 MMOL/L — SIGNIFICANT CHANGE UP (ref 17–32)
CO2 SERPL-SCNC: 23 MMOL/L — SIGNIFICANT CHANGE UP (ref 17–32)
CREAT SERPL-MCNC: 2.2 MG/DL — HIGH (ref 0.7–1.5)
CREAT SERPL-MCNC: 2.2 MG/DL — HIGH (ref 0.7–1.5)
EOSINOPHIL # BLD AUTO: 0 K/UL — SIGNIFICANT CHANGE UP (ref 0–0.7)
EOSINOPHIL NFR BLD AUTO: 0 % — SIGNIFICANT CHANGE UP (ref 0–8)
GLUCOSE SERPL-MCNC: 85 MG/DL — SIGNIFICANT CHANGE UP (ref 70–99)
GLUCOSE SERPL-MCNC: 95 MG/DL — SIGNIFICANT CHANGE UP (ref 70–99)
HCT VFR BLD CALC: 32.9 % — LOW (ref 42–52)
HGB BLD-MCNC: 10.4 G/DL — LOW (ref 14–18)
IMM GRANULOCYTES NFR BLD AUTO: 0.5 % — HIGH (ref 0.1–0.3)
LYMPHOCYTES # BLD AUTO: 13.7 % — LOW (ref 20.5–51.1)
LYMPHOCYTES # BLD AUTO: 2.03 K/UL — SIGNIFICANT CHANGE UP (ref 1.2–3.4)
MCHC RBC-ENTMCNC: 29.2 PG — SIGNIFICANT CHANGE UP (ref 27–31)
MCHC RBC-ENTMCNC: 31.6 G/DL — LOW (ref 32–37)
MCV RBC AUTO: 92.4 FL — SIGNIFICANT CHANGE UP (ref 80–94)
MONOCYTES # BLD AUTO: 1.19 K/UL — HIGH (ref 0.1–0.6)
MONOCYTES NFR BLD AUTO: 8 % — SIGNIFICANT CHANGE UP (ref 1.7–9.3)
NEUTROPHILS # BLD AUTO: 11.55 K/UL — HIGH (ref 1.4–6.5)
NEUTROPHILS NFR BLD AUTO: 77.7 % — HIGH (ref 42.2–75.2)
NRBC # BLD: 0 /100 WBCS — SIGNIFICANT CHANGE UP (ref 0–0)
PLATELET # BLD AUTO: 158 K/UL — SIGNIFICANT CHANGE UP (ref 130–400)
POTASSIUM SERPL-MCNC: 4.8 MMOL/L — SIGNIFICANT CHANGE UP (ref 3.5–5)
POTASSIUM SERPL-MCNC: 5.6 MMOL/L — HIGH (ref 3.5–5)
POTASSIUM SERPL-SCNC: 4.8 MMOL/L — SIGNIFICANT CHANGE UP (ref 3.5–5)
POTASSIUM SERPL-SCNC: 5.6 MMOL/L — HIGH (ref 3.5–5)
PROT SERPL-MCNC: 4.1 G/DL — LOW (ref 6–8)
PROT SERPL-MCNC: 4.3 G/DL — LOW (ref 6–8)
RBC # BLD: 3.56 M/UL — LOW (ref 4.7–6.1)
RBC # FLD: 18.3 % — HIGH (ref 11.5–14.5)
SODIUM SERPL-SCNC: 141 MMOL/L — SIGNIFICANT CHANGE UP (ref 135–146)
SODIUM SERPL-SCNC: 142 MMOL/L — SIGNIFICANT CHANGE UP (ref 135–146)
WBC # BLD: 14.85 K/UL — HIGH (ref 4.8–10.8)
WBC # FLD AUTO: 14.85 K/UL — HIGH (ref 4.8–10.8)

## 2019-10-22 PROCEDURE — 99232 SBSQ HOSP IP/OBS MODERATE 35: CPT

## 2019-10-22 PROCEDURE — 99233 SBSQ HOSP IP/OBS HIGH 50: CPT

## 2019-10-22 PROCEDURE — 70450 CT HEAD/BRAIN W/O DYE: CPT | Mod: 26

## 2019-10-22 PROCEDURE — 71045 X-RAY EXAM CHEST 1 VIEW: CPT | Mod: 26,77

## 2019-10-22 PROCEDURE — 71046 X-RAY EXAM CHEST 2 VIEWS: CPT | Mod: 26

## 2019-10-22 PROCEDURE — 71045 X-RAY EXAM CHEST 1 VIEW: CPT | Mod: 26

## 2019-10-22 RX ORDER — INSULIN HUMAN 100 [IU]/ML
10 INJECTION, SOLUTION SUBCUTANEOUS ONCE
Refills: 0 | Status: DISCONTINUED | OUTPATIENT
Start: 2019-10-22 | End: 2019-10-22

## 2019-10-22 RX ORDER — INSULIN HUMAN 100 [IU]/ML
10 INJECTION, SOLUTION SUBCUTANEOUS ONCE
Refills: 0 | Status: COMPLETED | OUTPATIENT
Start: 2019-10-22 | End: 2019-10-22

## 2019-10-22 RX ORDER — MORPHINE SULFATE 50 MG/1
1 CAPSULE, EXTENDED RELEASE ORAL
Qty: 100 | Refills: 0 | Status: DISCONTINUED | OUTPATIENT
Start: 2019-10-22 | End: 2019-10-23

## 2019-10-22 RX ORDER — DEXMEDETOMIDINE HYDROCHLORIDE IN 0.9% SODIUM CHLORIDE 4 UG/ML
0.2 INJECTION INTRAVENOUS
Qty: 200 | Refills: 0 | Status: DISCONTINUED | OUTPATIENT
Start: 2019-10-22 | End: 2019-10-23

## 2019-10-22 RX ORDER — HEPARIN SODIUM 5000 [USP'U]/ML
5000 INJECTION INTRAVENOUS; SUBCUTANEOUS EVERY 8 HOURS
Refills: 0 | Status: DISCONTINUED | OUTPATIENT
Start: 2019-10-22 | End: 2019-10-23

## 2019-10-22 RX ORDER — DEXTROSE 50 % IN WATER 50 %
50 SYRINGE (ML) INTRAVENOUS ONCE
Refills: 0 | Status: COMPLETED | OUTPATIENT
Start: 2019-10-22 | End: 2019-10-22

## 2019-10-22 RX ADMIN — SODIUM CHLORIDE 150 MILLILITER(S): 9 INJECTION INTRAMUSCULAR; INTRAVENOUS; SUBCUTANEOUS at 07:51

## 2019-10-22 RX ADMIN — HEPARIN SODIUM 5000 UNIT(S): 5000 INJECTION INTRAVENOUS; SUBCUTANEOUS at 22:48

## 2019-10-22 RX ADMIN — DEXMEDETOMIDINE HYDROCHLORIDE IN 0.9% SODIUM CHLORIDE 3.12 MICROGRAM(S)/KG/HR: 4 INJECTION INTRAVENOUS at 18:00

## 2019-10-22 RX ADMIN — SODIUM POLYSTYRENE SULFONATE 30 GRAM(S): 4.1 POWDER, FOR SUSPENSION ORAL at 00:51

## 2019-10-22 RX ADMIN — HEPARIN SODIUM 5000 UNIT(S): 5000 INJECTION INTRAVENOUS; SUBCUTANEOUS at 15:12

## 2019-10-22 RX ADMIN — Medication 5.11 MICROGRAM(S)/KG/MIN: at 05:26

## 2019-10-22 RX ADMIN — CHLORHEXIDINE GLUCONATE 15 MILLILITER(S): 213 SOLUTION TOPICAL at 05:24

## 2019-10-22 RX ADMIN — SODIUM CHLORIDE 150 MILLILITER(S): 9 INJECTION INTRAMUSCULAR; INTRAVENOUS; SUBCUTANEOUS at 11:52

## 2019-10-22 RX ADMIN — DEXMEDETOMIDINE HYDROCHLORIDE IN 0.9% SODIUM CHLORIDE 3.12 MICROGRAM(S)/KG/HR: 4 INJECTION INTRAVENOUS at 10:32

## 2019-10-22 RX ADMIN — Medication 50 MILLILITER(S): at 07:52

## 2019-10-22 RX ADMIN — LEVETIRACETAM 210 MILLIGRAM(S): 250 TABLET, FILM COATED ORAL at 18:07

## 2019-10-22 RX ADMIN — LEVETIRACETAM 210 MILLIGRAM(S): 250 TABLET, FILM COATED ORAL at 05:27

## 2019-10-22 RX ADMIN — Medication 100 MILLIGRAM(S): at 14:57

## 2019-10-22 RX ADMIN — FENTANYL CITRATE 2.73 MICROGRAM(S)/KG/HR: 50 INJECTION INTRAVENOUS at 07:51

## 2019-10-22 RX ADMIN — PANTOPRAZOLE SODIUM 40 MILLIGRAM(S): 20 TABLET, DELAYED RELEASE ORAL at 11:51

## 2019-10-22 RX ADMIN — CHLORHEXIDINE GLUCONATE 1 APPLICATION(S): 213 SOLUTION TOPICAL at 05:24

## 2019-10-22 RX ADMIN — SODIUM CHLORIDE 150 MILLILITER(S): 9 INJECTION INTRAMUSCULAR; INTRAVENOUS; SUBCUTANEOUS at 05:26

## 2019-10-22 RX ADMIN — CHLORHEXIDINE GLUCONATE 15 MILLILITER(S): 213 SOLUTION TOPICAL at 18:05

## 2019-10-22 RX ADMIN — CHLORHEXIDINE GLUCONATE 1 APPLICATION(S): 213 SOLUTION TOPICAL at 18:07

## 2019-10-22 RX ADMIN — Medication 5.11 MICROGRAM(S)/KG/MIN: at 07:51

## 2019-10-22 RX ADMIN — Medication 100 MILLIGRAM(S): at 22:47

## 2019-10-22 RX ADMIN — Medication 100 MILLIGRAM(S): at 05:24

## 2019-10-22 RX ADMIN — MORPHINE SULFATE 1 MG/HR: 50 CAPSULE, EXTENDED RELEASE ORAL at 18:45

## 2019-10-22 RX ADMIN — INSULIN HUMAN 10 UNIT(S): 100 INJECTION, SOLUTION SUBCUTANEOUS at 07:52

## 2019-10-22 NOTE — PROGRESS NOTE ADULT - SUBJECTIVE AND OBJECTIVE BOX
Neurology Follow up note    Name  OCTAVIO DORSEY    HPI:  89yo M w/ PMH of HTN and BPH presents to ED s/p cardiac arrest. As per son at bedside, pt had generalized weakness x 1 week. Associated w/ non-productive cough and poor PO intake. No fever noted. Pt was sitting and eating dinner at table, suddenly lost consciousness and stopped breathing. Son called EMS, EMS came within few minutes, found asystole on pt, started ACLS. Pt had ROSC after 4 rounds of epi. Pt intubated on the field. In ED, pt was found to be hypotensive and hypothermic. Central line placed in, pressors started, and received IV abx. As per ED attending, dark bloody stool noted. Hgb 11.4. Repeat CBC shows Hgb 10. (20 Oct 2019 20:15)      Interval History:    patient remains clinically unchanged  start on keppra for epileptiform discharges on eeg      Vital Signs Last 24 Hrs  T(C): 35.4 (22 Oct 2019 07:02), Max: 37.6 (21 Oct 2019 13:00)  T(F): 95.8 (22 Oct 2019 07:02), Max: 99.7 (21 Oct 2019 13:00)  HR: 79 (22 Oct 2019 10:07) (67 - 84)  BP: 99/68 (22 Oct 2019 10:07) (81/58 - 142/75)  BP(mean): 79 (22 Oct 2019 10:07) (63 - 96)  RR: 20 (22 Oct 2019 10:07) (5 - 22)  SpO2: 97% (22 Oct 2019 10:07) (93% - 100%)    Neurological Exam:   no arousal to deep pain  eyes midline  pupils pp  no w/d to nb pressure    Medications  chlorhexidine 0.12% Liquid 15 milliLiter(s) Oral Mucosa two times a day  chlorhexidine 4% Liquid 1 Application(s) Topical two times a day  clindamycin IVPB      clindamycin IVPB 600 milliGRAM(s) IV Intermittent every 8 hours  dexmedetomidine Infusion 0.2 MICROgram(s)/kG/Hr IV Continuous <Continuous>  heparin  Injectable 5000 Unit(s) SubCutaneous every 8 hours  influenza   Vaccine 0.5 milliLiter(s) IntraMuscular once  levETIRAcetam  IVPB 250 milliGRAM(s) IV Intermittent every 12 hours  norepinephrine Infusion 0.05 MICROgram(s)/kG/Min IV Continuous <Continuous>  pantoprazole  Injectable 40 milliGRAM(s) IV Push daily  sodium chloride 0.9%. 1000 milliLiter(s) IV Continuous <Continuous>  tamsulosin 0.4 milliGRAM(s) Oral at bedtime      Lab  10-22    142  |  106  |  68<HH>  ----------------------------<  85  5.6<H>   |  23  |  2.2<H>    Ca    7.6<L>      22 Oct 2019 05:43  Phos  5.3     10-21  Mg     2.2     10-21    TPro  4.3<L>  /  Alb  2.5<L>  /  TBili  0.9  /  DBili  x   /  AST  205<H>  /  ALT  813<H>  /  AlkPhos  52  10-22                          10.4   14.85 )-----------( 158      ( 22 Oct 2019 05:43 )             32.9     LIVER FUNCTIONS - ( 22 Oct 2019 05:43 )  Alb: 2.5 g/dL / Pro: 4.3 g/dL / ALK PHOS: 52 U/L / ALT: 813 U/L / AST: 205 U/L / GGT: x                   Radiology      Assessment:  89 yo man s/p prolonged CE  no clinical improvement  Plan:  spoke in detail with son who is considering terminal extubation  prognosis grave

## 2019-10-22 NOTE — PROGRESS NOTE ADULT - ASSESSMENT
IMPRESSION:  ARF s/p cardiac arrest ?? arrythmia hyperkalemia doubt PNA or sepsis cause of cardiac arrest    GERRY   Multiorgan failure   liver failure shock   hyperkalemia   ?? asp PNA RLL     PLAN:    CNS: sedation vacation , do ct brain  to evaluate     HEENT: oral care     PULMONARY: lower Fio2 to 40 % doubt PE leslie patient now on low vent setting even with opacity on RLL   minimal pressors very unlikely to have cardiac arrest because of PE and you will be on low vent setting and pressors   follow echo if any RV strain     CARDIOVASCULAR: cardiology follow  echo result   taper pressors   keep IS = OS might   apply CHEETAH     GI: GI prophylaxis.  NG Feeding   on PPI   follow LFT improving   GI consult     RENAL: follow lytes IS and OS , bun , cr    INFECTIOUS DISEASE: start Unasyn   follow cx   DTA     HEMATOLOGICAL:  DVT prophylaxis.start heparin sq i  seriel cbc   follow doppler lower ext stat     ENDOCRINE:  Follow up FS.  Insulin protocol if needed.  guarded prognosis         CRITICAL CARE TIME SPENT: *** IMPRESSION:  ARF s/p cardiac arrest ?? arrythmia hyperkalemia doubt PNA or sepsis cause of cardiac arrest    GERRY   Multiorgan failure   liver failure shock   hyperkalemia   ?? asp PNA RLL     PLAN:    CNS: sedation vacation , do ct brain  to evaluate     HEENT: oral care     PULMONARY: lower Fio2 to 40 % doubt PE leslie patient now on low vent setting even with opacity on RLL   minimal pressors very unlikely to have cardiac arrest because of PE and you will be on low vent setting and pressors   echo no sign of EV strain finding look most likely chronic pulmonary htn     CARDIOVASCULAR: cardiology follow  echo result   taper pressors   keep IS = OS might   apply CHEETAH     GI: GI prophylaxis.  NG Feeding   on PPI   follow LFT improving   GI consult     RENAL: follow lytes IS and OS , bun , cr    INFECTIOUS DISEASE: start Unasyn   follow cx   DTA     HEMATOLOGICAL:  DVT prophylaxis.start heparin sq if no bleed   seriel cbc   follow doppler lower ext stat     ENDOCRINE:  Follow up FS.  Insulin protocol if needed.  guarded prognosis         CRITICAL CARE TIME SPENT: ***

## 2019-10-22 NOTE — PROGRESS NOTE ADULT - ATTENDING COMMENTS
I have personally seen, examined and participated in the care of this patient
Pt seen and examined at bedside independently. Pt intubated. Exam sluggish to Pupil reflex.  Labs reviewed.   I agree with the above plan   #Progress Note Handoff  Pending (specify):  Consults___CC, Neuro, Cardio,  GI______, Tests________, Test Results_______, Other_________  Family discussion: NA  Disposition: Home___/SNF___/Other________/Unknown at this time____x Poor prognosis ____

## 2019-10-22 NOTE — DIETITIAN INITIAL EVALUATION ADULT. - PHYSICAL APPEARANCE
intubated, NGT in place, on levophed. BMI - 19.7, IBW- 75.5kg +/-10%Skin tears to RUE. GI - 1 small, loose, dark brown BM today (positive occult blood yesterday).

## 2019-10-22 NOTE — PROGRESS NOTE ADULT - SUBJECTIVE AND OBJECTIVE BOX
Patient is a 89y old  Male who presents with a chief complaint of cardiac arrest (22 Oct 2019 08:38)      Over Night Events:  Patient seen and examined open eyes       ROS:  See HPI    PHYSICAL EXAM    ICU Vital Signs Last 24 Hrs  T(C): 35.4 (22 Oct 2019 07:02), Max: 37.9 (21 Oct 2019 11:00)  T(F): 95.8 (22 Oct 2019 07:02), Max: 100.2 (21 Oct 2019 11:00)  HR: 75 (22 Oct 2019 07:58) (60 - 80)  BP: 112/56 (22 Oct 2019 07:07) (81/58 - 142/75)  BP(mean): 79 (22 Oct 2019 07:07) (63 - 96)  ABP: --  ABP(mean): --  RR: 20 (22 Oct 2019 07:07) (5 - 23)  SpO2: 99% (22 Oct 2019 07:58) (93% - 100%)      General: open eyes   HEENT:                Lymph Nodes: NO cervical LN   Lungs: Bilateral BS  Cardiovascular: Regular   Abdomen: Soft, Positive BS  Extremities: No clubbing   Skin: warm   Neurological:   Musculoskeletal: move all ext     I&O's Detail    21 Oct 2019 07:01  -  22 Oct 2019 07:00  --------------------------------------------------------  IN:    Enteral Tube Flush: 120 mL    fentaNYL Infusion.: 51.5 mL    IV PiggyBack: 250 mL    norepinephrine Infusion: 760 mL    sodium chloride 0.9%.: 3100 mL  Total IN: 4281.5 mL    OUT:    Indwelling Catheter - Suprapubic: 340 mL  Total OUT: 340 mL    Total NET: 3941.5 mL      22 Oct 2019 07:01  -  22 Oct 2019 09:02  --------------------------------------------------------  IN:    fentaNYL Infusion.: 9 mL    norepinephrine Infusion: 56 mL    sodium chloride 0.9%.: 450 mL  Total IN: 515 mL    OUT:    Indwelling Catheter - Suprapubic: 35 mL  Total OUT: 35 mL    Total NET: 480 mL          LABS:                          10.4   14.85 )-----------( 158      ( 22 Oct 2019 05:43 )             32.9         22 Oct 2019 05:43    142    |  106    |  68     ----------------------------<  85     5.6     |  23     |  2.2      Ca    7.6        22 Oct 2019 05:43  Phos  5.3       21 Oct 2019 06:15  Mg     2.2       21 Oct 2019 06:15    TPro  4.3    /  Alb  2.5    /  TBili  0.9    /  DBili  x      /  AST  205    /  ALT  813    /  AlkPhos  52     22 Oct 2019 05:43  Amylase x     lipase x                                                 PT/INR - ( 20 Oct 2019 18:20 )   PT: 16.20 sec;   INR: 1.41 ratio         PTT - ( 20 Oct 2019 18:20 )  PTT:33.8 sec                                       Urinalysis Basic - ( 21 Oct 2019 12:27 )    Color: Yellow / Appearance: Clear / SG: >=1.030 / pH: x  Gluc: x / Ketone: Trace  / Bili: Small / Urobili: 0.2 mg/dL   Blood: x / Protein: >=300 mg/dL / Nitrite: Negative   Leuk Esterase: Small / RBC: 11-25 /HPF / WBC 26-50 /HPF   Sq Epi: x / Non Sq Epi: Few /HPF / Bacteria: Many        Lactate, Blood: 2.7 mmol/L (10-21-19 @ 06:15)  Lactate, Blood: 7.0 mmol/L (10-20-19 @ 18:20)    CARDIAC MARKERS ( 21 Oct 2019 06:15 )  x     / 0.07 ng/mL / 205 U/L / x     / 11.6 ng/mL  CARDIAC MARKERS ( 20 Oct 2019 18:20 )  x     / 0.03 ng/mL / 109 U/L / x     / x                                                                                                         Mode: Auto Mode: PRVC/ Volume Support  RR (machine): 20  TV (machine): 500  FiO2: 60  PEEP: 8  MAP: 12  PIP: 20                                      ABG - ( 22 Oct 2019 04:45 )  pH, Arterial: 7.37  pH, Blood: x     /  pCO2: 37    /  pO2: 116   / HCO3: 21    / Base Excess: -3.8  /  SaO2: 99                  MEDICATIONS  (STANDING):  chlorhexidine 0.12% Liquid 15 milliLiter(s) Oral Mucosa two times a day  chlorhexidine 4% Liquid 1 Application(s) Topical two times a day  clindamycin IVPB      clindamycin IVPB 600 milliGRAM(s) IV Intermittent every 8 hours  fentaNYL   Infusion. 0.5 MICROgram(s)/kG/Hr (2.727 mL/Hr) IV Continuous <Continuous>  influenza   Vaccine 0.5 milliLiter(s) IntraMuscular once  insulin regular  human recombinant. 10 Unit(s) SubCutaneous once  levETIRAcetam  IVPB 250 milliGRAM(s) IV Intermittent every 12 hours  norepinephrine Infusion 0.05 MICROgram(s)/kG/Min (5.113 mL/Hr) IV Continuous <Continuous>  pantoprazole  Injectable 40 milliGRAM(s) IV Push daily  sodium chloride 0.9%. 1000 milliLiter(s) (150 mL/Hr) IV Continuous <Continuous>  tamsulosin 0.4 milliGRAM(s) Oral at bedtime    MEDICATIONS  (PRN):          Xrays:  TLC:  OG:  ET tube:                                                                                    right lower opacity    ECHO:  [facility] ICU: Patient is a 89y old  Male who presents with a chief complaint of cardiac arrest (22 Oct 2019 08:38)      Over Night Events:  Patient seen and examined off sedation hyperventilate       ROS:  See HPI    PHYSICAL EXAM    ICU Vital Signs Last 24 Hrs  T(C): 35.4 (22 Oct 2019 07:02), Max: 37.9 (21 Oct 2019 11:00)  T(F): 95.8 (22 Oct 2019 07:02), Max: 100.2 (21 Oct 2019 11:00)  HR: 75 (22 Oct 2019 07:58) (60 - 80)  BP: 112/56 (22 Oct 2019 07:07) (81/58 - 142/75)  BP(mean): 79 (22 Oct 2019 07:07) (63 - 96)  ABP: --  ABP(mean): --  RR: 20 (22 Oct 2019 07:07) (5 - 23)  SpO2: 99% (22 Oct 2019 07:58) (93% - 100%)      General: open eyes sometimes   HEENT:          kari      Lymph Nodes: NO cervical LN   Lungs: Bilateral BS  Cardiovascular: Regular   Abdomen: Soft, Positive BS  Extremities: No clubbing   Skin: warm   Neurological: positive gag   Musculoskeletal: move all ext     I&O's Detail    21 Oct 2019 07:01  -  22 Oct 2019 07:00  --------------------------------------------------------  IN:    Enteral Tube Flush: 120 mL    fentaNYL Infusion.: 51.5 mL    IV PiggyBack: 250 mL    norepinephrine Infusion: 760 mL    sodium chloride 0.9%.: 3100 mL  Total IN: 4281.5 mL    OUT:    Indwelling Catheter - Suprapubic: 340 mL  Total OUT: 340 mL    Total NET: 3941.5 mL      22 Oct 2019 07:01  -  22 Oct 2019 09:02  --------------------------------------------------------  IN:    fentaNYL Infusion.: 9 mL    norepinephrine Infusion: 56 mL    sodium chloride 0.9%.: 450 mL  Total IN: 515 mL    OUT:    Indwelling Catheter - Suprapubic: 35 mL  Total OUT: 35 mL    Total NET: 480 mL          LABS:                          10.4   14.85 )-----------( 158      ( 22 Oct 2019 05:43 )             32.9         22 Oct 2019 05:43    142    |  106    |  68     ----------------------------<  85     5.6     |  23     |  2.2      Ca    7.6        22 Oct 2019 05:43  Phos  5.3       21 Oct 2019 06:15  Mg     2.2       21 Oct 2019 06:15    TPro  4.3    /  Alb  2.5    /  TBili  0.9    /  DBili  x      /  AST  205    /  ALT  813    /  AlkPhos  52     22 Oct 2019 05:43  Amylase x     lipase x                                                 PT/INR - ( 20 Oct 2019 18:20 )   PT: 16.20 sec;   INR: 1.41 ratio         PTT - ( 20 Oct 2019 18:20 )  PTT:33.8 sec                                       Urinalysis Basic - ( 21 Oct 2019 12:27 )    Color: Yellow / Appearance: Clear / SG: >=1.030 / pH: x  Gluc: x / Ketone: Trace  / Bili: Small / Urobili: 0.2 mg/dL   Blood: x / Protein: >=300 mg/dL / Nitrite: Negative   Leuk Esterase: Small / RBC: 11-25 /HPF / WBC 26-50 /HPF   Sq Epi: x / Non Sq Epi: Few /HPF / Bacteria: Many        Lactate, Blood: 2.7 mmol/L (10-21-19 @ 06:15)  Lactate, Blood: 7.0 mmol/L (10-20-19 @ 18:20)    CARDIAC MARKERS ( 21 Oct 2019 06:15 )  x     / 0.07 ng/mL / 205 U/L / x     / 11.6 ng/mL  CARDIAC MARKERS ( 20 Oct 2019 18:20 )  x     / 0.03 ng/mL / 109 U/L / x     / x                                                                                                         Mode: Auto Mode: PRVC/ Volume Support  RR (machine): 20  TV (machine): 500  FiO2: 60  PEEP: 8  MAP: 12  PIP: 20                                      ABG - ( 22 Oct 2019 04:45 )  pH, Arterial: 7.37  pH, Blood: x     /  pCO2: 37    /  pO2: 116   / HCO3: 21    / Base Excess: -3.8  /  SaO2: 99                  MEDICATIONS  (STANDING):  chlorhexidine 0.12% Liquid 15 milliLiter(s) Oral Mucosa two times a day  chlorhexidine 4% Liquid 1 Application(s) Topical two times a day  clindamycin IVPB      clindamycin IVPB 600 milliGRAM(s) IV Intermittent every 8 hours  fentaNYL   Infusion. 0.5 MICROgram(s)/kG/Hr (2.727 mL/Hr) IV Continuous <Continuous>  influenza   Vaccine 0.5 milliLiter(s) IntraMuscular once  insulin regular  human recombinant. 10 Unit(s) SubCutaneous once  levETIRAcetam  IVPB 250 milliGRAM(s) IV Intermittent every 12 hours  norepinephrine Infusion 0.05 MICROgram(s)/kG/Min (5.113 mL/Hr) IV Continuous <Continuous>  pantoprazole  Injectable 40 milliGRAM(s) IV Push daily  sodium chloride 0.9%. 1000 milliLiter(s) (150 mL/Hr) IV Continuous <Continuous>  tamsulosin 0.4 milliGRAM(s) Oral at bedtime    MEDICATIONS  (PRN):          Xrays:  TLC:  OG:  ET tube:                                                                                    right lower opacity    ECHO:  [facility] ICU: Patient is a 89y old  Male who presents with a chief complaint of cardiac arrest (22 Oct 2019 08:38)      Over Night Events:  Patient seen and examined off sedation hyperventilate       ROS:  See HPI    PHYSICAL EXAM    ICU Vital Signs Last 24 Hrs  T(C): 35.4 (22 Oct 2019 07:02), Max: 37.9 (21 Oct 2019 11:00)  T(F): 95.8 (22 Oct 2019 07:02), Max: 100.2 (21 Oct 2019 11:00)  HR: 75 (22 Oct 2019 07:58) (60 - 80)  BP: 112/56 (22 Oct 2019 07:07) (81/58 - 142/75)  BP(mean): 79 (22 Oct 2019 07:07) (63 - 96)  ABP: --  ABP(mean): --  RR: 20 (22 Oct 2019 07:07) (5 - 23)  SpO2: 99% (22 Oct 2019 07:58) (93% - 100%)      General: open eyes sometimes   HEENT:          kari      Lymph Nodes: NO cervical LN   Lungs: Bilateral BS  Cardiovascular: Regular   Abdomen: Soft, Positive BS  Extremities: No clubbing   Skin: warm   Neurological: positive gag   Musculoskeletal:     I&O's Detail    21 Oct 2019 07:01  -  22 Oct 2019 07:00  --------------------------------------------------------  IN:    Enteral Tube Flush: 120 mL    fentaNYL Infusion.: 51.5 mL    IV PiggyBack: 250 mL    norepinephrine Infusion: 760 mL    sodium chloride 0.9%.: 3100 mL  Total IN: 4281.5 mL    OUT:    Indwelling Catheter - Suprapubic: 340 mL  Total OUT: 340 mL    Total NET: 3941.5 mL      22 Oct 2019 07:01  -  22 Oct 2019 09:02  --------------------------------------------------------  IN:    fentaNYL Infusion.: 9 mL    norepinephrine Infusion: 56 mL    sodium chloride 0.9%.: 450 mL  Total IN: 515 mL    OUT:    Indwelling Catheter - Suprapubic: 35 mL  Total OUT: 35 mL    Total NET: 480 mL          LABS:                          10.4   14.85 )-----------( 158      ( 22 Oct 2019 05:43 )             32.9         22 Oct 2019 05:43    142    |  106    |  68     ----------------------------<  85     5.6     |  23     |  2.2      Ca    7.6        22 Oct 2019 05:43  Phos  5.3       21 Oct 2019 06:15  Mg     2.2       21 Oct 2019 06:15    TPro  4.3    /  Alb  2.5    /  TBili  0.9    /  DBili  x      /  AST  205    /  ALT  813    /  AlkPhos  52     22 Oct 2019 05:43  Amylase x     lipase x                                                 PT/INR - ( 20 Oct 2019 18:20 )   PT: 16.20 sec;   INR: 1.41 ratio         PTT - ( 20 Oct 2019 18:20 )  PTT:33.8 sec                                       Urinalysis Basic - ( 21 Oct 2019 12:27 )    Color: Yellow / Appearance: Clear / SG: >=1.030 / pH: x  Gluc: x / Ketone: Trace  / Bili: Small / Urobili: 0.2 mg/dL   Blood: x / Protein: >=300 mg/dL / Nitrite: Negative   Leuk Esterase: Small / RBC: 11-25 /HPF / WBC 26-50 /HPF   Sq Epi: x / Non Sq Epi: Few /HPF / Bacteria: Many        Lactate, Blood: 2.7 mmol/L (10-21-19 @ 06:15)  Lactate, Blood: 7.0 mmol/L (10-20-19 @ 18:20)    CARDIAC MARKERS ( 21 Oct 2019 06:15 )  x     / 0.07 ng/mL / 205 U/L / x     / 11.6 ng/mL  CARDIAC MARKERS ( 20 Oct 2019 18:20 )  x     / 0.03 ng/mL / 109 U/L / x     / x                                                                                                         Mode: Auto Mode: PRVC/ Volume Support  RR (machine): 20  TV (machine): 500  FiO2: 60  PEEP: 8  MAP: 12  PIP: 20                                      ABG - ( 22 Oct 2019 04:45 )  pH, Arterial: 7.37  pH, Blood: x     /  pCO2: 37    /  pO2: 116   / HCO3: 21    / Base Excess: -3.8  /  SaO2: 99                  MEDICATIONS  (STANDING):  chlorhexidine 0.12% Liquid 15 milliLiter(s) Oral Mucosa two times a day  chlorhexidine 4% Liquid 1 Application(s) Topical two times a day  clindamycin IVPB      clindamycin IVPB 600 milliGRAM(s) IV Intermittent every 8 hours  fentaNYL   Infusion. 0.5 MICROgram(s)/kG/Hr (2.727 mL/Hr) IV Continuous <Continuous>  influenza   Vaccine 0.5 milliLiter(s) IntraMuscular once  insulin regular  human recombinant. 10 Unit(s) SubCutaneous once  levETIRAcetam  IVPB 250 milliGRAM(s) IV Intermittent every 12 hours  norepinephrine Infusion 0.05 MICROgram(s)/kG/Min (5.113 mL/Hr) IV Continuous <Continuous>  pantoprazole  Injectable 40 milliGRAM(s) IV Push daily  sodium chloride 0.9%. 1000 milliLiter(s) (150 mL/Hr) IV Continuous <Continuous>  tamsulosin 0.4 milliGRAM(s) Oral at bedtime    MEDICATIONS  (PRN):          Xrays:  TLC:  OG:  ET tube:                                                                                    right lower opacity    ECHO:  [facility] ICU:

## 2019-10-22 NOTE — PROGRESS NOTE ADULT - SUBJECTIVE AND OBJECTIVE BOX
89yMale  Being followed for Transaminitis   Interval history: No hematemesis, melena, blood in stool reported. Patient on pressors unresponsive with anoxic brain injury.      PAST MEDICAL & SURGICAL HISTORY:   Enlarged prostate  Hypertension  S/P TURP        Social History: No smoking. No alcohol. No illegal drug use.          MEDICATIONS:  MEDICATIONS  (STANDING):  chlorhexidine 0.12% Liquid 15 milliLiter(s) Oral Mucosa two times a day  chlorhexidine 4% Liquid 1 Application(s) Topical two times a day  clindamycin IVPB      clindamycin IVPB 600 milliGRAM(s) IV Intermittent every 8 hours  dexmedetomidine Infusion 0.2 MICROgram(s)/kG/Hr (3.115 mL/Hr) IV Continuous <Continuous>  heparin  Injectable 5000 Unit(s) SubCutaneous every 8 hours  influenza   Vaccine 0.5 milliLiter(s) IntraMuscular once  levETIRAcetam  IVPB 250 milliGRAM(s) IV Intermittent every 12 hours  norepinephrine Infusion 0.05 MICROgram(s)/kG/Min (5.113 mL/Hr) IV Continuous <Continuous>  pantoprazole  Injectable 40 milliGRAM(s) IV Push daily  sodium chloride 0.9%. 1000 milliLiter(s) (150 mL/Hr) IV Continuous <Continuous>  tamsulosin 0.4 milliGRAM(s) Oral at bedtime    MEDICATIONS  (PRN):      Allergies:     penicillin (Short breath)              REVIEW OF SYSTEMS:  Unobtainable       VITAL SIGNS:   T(F): 94.6 (10-22-19 @ 11:00), Max: 99 (10-21-19 @ 15:10)  HR: 79 (10-22-19 @ 10:07) (67 - 84)  BP: 99/68 (10-22-19 @ 10:07) (81/58 - 142/75)  RR: 20 (10-22-19 @ 12:59) (5 - 23)  SpO2: 97% (10-22-19 @ 10:07) (93% - 100%)    PHYSICAL EXAM:  GENERAL: +intubated, on pressors   HEAD:  Atraumatic, Normocephalic  EYES: conjunctiva and sclera clear  NECK: Supple, no JVD or thyromegaly  CHEST/LUNG: +rhonchi b/l  HEART: Regular rate and rhythm; normal S1, S2, No murmurs.  ABDOMEN: Soft, nondistended; Bowel sounds present, no abdominal bruit, masses, or hepatosplenomegaly  NEUROLOGY: No asterixis or tremor.   SKIN: Intact, no jaundice            LABS:                        10.4   14.85 )-----------( 158      ( 22 Oct 2019 05:43 )             32.9     10-22    141  |  105  |  68<HH>  ----------------------------<  95  4.8   |  23  |  2.2<H>    Ca    7.5<L>      22 Oct 2019 12:20  Phos  5.3     10-21  Mg     2.2     10-21    TPro  4.1<L>  /  Alb  2.4<L>  /  TBili  0.8  /  DBili  x   /  AST  159<H>  /  ALT  696<H>  /  AlkPhos  49  10-22    LIVER FUNCTIONS - ( 22 Oct 2019 12:20 )  Alb: 2.4 g/dL / Pro: 4.1 g/dL / ALK PHOS: 49 U/L / ALT: 696 U/L / AST: 159 U/L / GGT: x           PT/INR - ( 20 Oct 2019 18:20 )   PT: 16.20 sec;   INR: 1.41 ratio         PTT - ( 20 Oct 2019 18:20 )  PTT:33.8 sec    IMAGING:    < from: US Abdomen Limited (10.21.19 @ 13:08) >    EXAM:  US ABDOMEN LIMITED            PROCEDURE DATE:  10/21/2019            INTERPRETATION:  CLINICAL HISTORY: Elevated liver function tests..    COMPARISON: CT abdomen pelvis March 24, 2018.    PROCEDURE: Limited portable Ultrasound of the right upper quadrant was   performed in the CCU.    FINDINGS:    LIVER:  Normal in contour and echogenicity measuring 12.4 cm in length.   No focal mass. There is a right hepatic lobe simple cyst measuring 1.4 cm.    GALLBLADDER/BILIARY TREE:  No evidence ofcholelithiasis. No wall   thickening. There is trace pericholecystic fluid. Negative sonographic   Gaona's sign. No intrahepatic biliary ductal dilatation. The common bile   duct measures 5 mm, which is normal.     PANCREAS:  Visualized head and body are unremarkable. Remainder obscured   by overlying bowel gas.    KIDNEY:  Right kidney measures 9.8 cm in length. No hydronephrosis,   calculi or solid mass. There is a 1.4 cm upper pole cyst and another 1.8   cm lower pole cyst.    AORTA/IVC:  Visualized proximal portions unremarkable.    ASCITES:  Small abdominal ascites.    IMPRESSION:    No evidence of cholelithiasis.    Trace pericholecystic fluid/gallbladder wall edema, nonspecific. No   sonographic evidence of acute cholecystitis.    Right renal simple cysts.    Small perihepatic ascites.                  REBECCA RIGGINS M.D., ATTENDING RADIOLOGIST  This document has been electronically signed. Oct 21 2019  1:23PM              < end of copied text >

## 2019-10-22 NOTE — CONSULT NOTE ADULT - ASSESSMENT
Pt with HTN, BPH, admitte with s/p cardiac arrest, seen for  and hyperKemia.    Aki on CKD - baseline creat 1.8-2.1 mg%  in 2018  - creat is close to baseline  - pt was fluid responsive on Cheetah, may cont  cc/hr    Hyperkalemia - improving with medical management  - may repeat Insulin and Glucose  - repeat BMP in the pm  - not a candidate for RRT since it is not going to change the poor overall prognosis    Will sign off  call as needed

## 2019-10-22 NOTE — PROGRESS NOTE ADULT - ASSESSMENT
89yMale pmh HTN, BPH s/p cardiac arrest s/p 4 rounds CPR with ROSC achieved. Patient intubated on pressors with anoxic brain injury. GI Consulted for elevated LFTs.    Problem 1-Elevated LFTs likely ischemic hepatitis   Rec  -Ultrasound of abdomen no cholelithiasis, CBD 5mm  -Trend LFTs daily and INR daily  -Can Check Hepatitis B Sag, Hep B SAB, Hep A Ab, Hep C Ab,Smooth Muscle Antibody, Transferrin Saturation, SPEP, AMA, JEROD, Ceruloplasmin, Ferritin, Alpha-1-antitrypsin, CMV,     Problem 2-One episode of dark blood in stool; differential includes stress induced erosive gastritis vs ischemic colitis  Rec  -Maintain Hemodynamic Stability   -Monitor CBC  -CMP,Optimize Electrolytes  -Monitor H and H  -Transfuse prn to hgb >8  -PPI IV BID  -Monitor Vital Signs  -Monitor Stool For blood, frequency, consistency, melena  -Active Type and Screen 89yMale pmh HTN, BPH s/p cardiac arrest s/p 4 rounds CPR with ROSC achieved. Patient intubated on pressors with anoxic brain injury. GI Consulted for elevated LFTs.    Problem 1-Elevated LFTs likely ischemic hepatitis   Rec  -Ultrasound of abdomen no cholelithiasis, CBD 5mm  -Trend LFTs daily and INR daily  -Can Check Hepatitis B Sag, Hep B SAB, Hep A Ab, Hep C Ab,Smooth Muscle Antibody, Transferrin Saturation, SPEP, AMA, JEROD, Ceruloplasmin, Ferritin, Alpha-1-antitrypsin, CMV,     Problem 2-One episode of dark blood in stool; differential includes stress induced erosive gastritis vs ischemic colitis  Rec  -Maintain Hemodynamic Stability   -Monitor CBC  -CMP,Optimize Electrolytes  -Monitor H and H  -Transfuse prn to hgb >8  -PPI IV BID  -Monitor Vital Signs  -Monitor Stool For blood, frequency, consistency, melena  -Active Type and Screen    Recall GI if needed

## 2019-10-22 NOTE — PHARMACOTHERAPY INTERVENTION NOTE - COMMENTS
I noticed the patient was not on DVT prophylaxis. I recommended to start the patit the medical team wanted to monitor the H/H before starting therapy.

## 2019-10-22 NOTE — CONSULT NOTE ADULT - SUBJECTIVE AND OBJECTIVE BOX
NEPHROLOGY CONSULTATION NOTE    88yo M w/ PMH of HTN and BPH presents to ED s/p cardiac arrest. As per son at bedside, pt had generalized weakness x 1 week. Associated w/ non-productive cough and poor PO intake. No fever noted. Pt was sitting and eating dinner at table, suddenly lost consciousness and stopped breathing. Son called EMS, EMS came within few minutes, found asystole on pt, started ACLS. Pt had ROSC after 4 rounds of epi. Pt intubated on the field. In ED, pt was found to be hypotensive and hypothermic. Central line placed in, pressors started, and received IV abx. As per ED attending, dark bloody stool noted. Hgb 11.4. Repeat CBC shows Hgb 10.   Pt noted to have GERRY and high K, treated with Ins Glucose and IVF.   Family considering terminal wean  PAST MEDICAL & SURGICAL HISTORY:  Enlarged prostate  Hypertension  S/P TURP    Allergies:  penicillin (Short breath)    Home Medications Reviewed  Hospital Medications:   MEDICATIONS  (STANDING):  chlorhexidine 0.12% Liquid 15 milliLiter(s) Oral Mucosa two times a day  chlorhexidine 4% Liquid 1 Application(s) Topical two times a day  clindamycin IVPB      clindamycin IVPB 600 milliGRAM(s) IV Intermittent every 8 hours  dexmedetomidine Infusion 0.2 MICROgram(s)/kG/Hr (3.115 mL/Hr) IV Continuous <Continuous>  heparin  Injectable 5000 Unit(s) SubCutaneous every 8 hours  influenza   Vaccine 0.5 milliLiter(s) IntraMuscular once  levETIRAcetam  IVPB 250 milliGRAM(s) IV Intermittent every 12 hours  norepinephrine Infusion 0.05 MICROgram(s)/kG/Min (5.113 mL/Hr) IV Continuous <Continuous>  pantoprazole  Injectable 40 milliGRAM(s) IV Push daily  sodium chloride 0.9%. 1000 milliLiter(s) (150 mL/Hr) IV Continuous <Continuous>  tamsulosin 0.4 milliGRAM(s) Oral at bedtime      SOCIAL HISTORY:  Denies ETOH,Smoking,   FAMILY HISTORY:  No pertinent family history in first degree relatives    Yes      REVIEW OF SYSTEMS:  Unable to obtain  VASCULAR: No bilateral lower extremity edema.   All other review of systems is negative unless indicated above.    VITALS:  T(F): 95.8 (10-22-19 @ 07:02), Max: 100.2 (10-21-19 @ 11:00)  HR: 75 (10-22-19 @ 07:58)  BP: 112/56 (10-22-19 @ 07:07)  RR: 21 (10-22-19 @ 09:00)  SpO2: 99% (10-22-19 @ 07:58)    10-21 @ 07:01  -  10-22 @ 07:00  --------------------------------------------------------  IN: 4281.5 mL / OUT: 340 mL / NET: 3941.5 mL    10-22 @ 07:01  -  10-22 @ 10:23  --------------------------------------------------------  IN: 515 mL / OUT: 55 mL / NET: 460 mL      Height (cm): 177.8 (10-21 @ 12:44)  Weight (kg): 62.3 (10-21 @ 12:44)  BMI (kg/m2): 19.7 (10-21 @ 12:44)  BSA (m2): 1.78 (10-21 @ 12:44)      I&O's Detail    21 Oct 2019 07:01  -  22 Oct 2019 07:00  --------------------------------------------------------  IN:    Enteral Tube Flush: 120 mL    fentaNYL Infusion.: 51.5 mL    IV PiggyBack: 250 mL    norepinephrine Infusion: 760 mL    sodium chloride 0.9%.: 3100 mL  Total IN: 4281.5 mL    OUT:    Indwelling Catheter - Suprapubic: 340 mL  Total OUT: 340 mL    Total NET: 3941.5 mL      22 Oct 2019 07:01  -  22 Oct 2019 10:23  --------------------------------------------------------  IN:    fentaNYL Infusion.: 9 mL    norepinephrine Infusion: 56 mL    sodium chloride 0.9%.: 450 mL  Total IN: 515 mL    OUT:    Indwelling Catheter - Suprapubic: 55 mL  Total OUT: 55 mL    Total NET: 460 mL            PHYSICAL EXAM:  Constitutional: NAD, on vent  HEENT: anicteric sclera, oropharynx clear, MMM  Neck: No JVD  Respiratory: CTAB,  Cardiovascular: S1, S2, RRR  Gastrointestinal: BS+, soft, NT/ND  Extremities:  No peripheral edema  Neurological: Unresponsive  : SPC+  Skin: No rashes  Vascular Access:    LABS:  10-22    142  |  106  |  68<HH>  ----------------------------<  85  5.6<H>   |  23  |  2.2<H>  Potassium Trend: 5.6<--, 6.1<--, 6.2<--, 5.5<--, 5.9<--    Ca    7.6<L>      22 Oct 2019 05:43  Phos  5.3     10-21  Mg     2.2     10-21    TPro  4.3<L>  /  Alb  2.5<L>  /  TBili  0.9  /  DBili      /  AST  205<H>  /  ALT  813<H>  /  AlkPhos  52  10-22    Creatinine Trend: 2.2 <--, 2.2 <--, 1.8 <--, 1.7 <--, 1.7 <--, 1.9 <--                        10.4   14.85 )-----------( 158      ( 22 Oct 2019 05:43 )             32.9     Urine Studies:  Urinalysis Basic - ( 21 Oct 2019 12:27 )    Color: Yellow / Appearance: Clear / SG: >=1.030 / pH:   Gluc:  / Ketone: Trace  / Bili: Small / Urobili: 0.2 mg/dL   Blood:  / Protein: >=300 mg/dL / Nitrite: Negative   Leuk Esterase: Small / RBC: 11-25 /HPF / WBC 26-50 /HPF   Sq Epi:  / Non Sq Epi: Few /HPF / Bacteria: Many                RADIOLOGY & ADDITIONAL STUDIES:    < from: Xray Chest 1 View- PORTABLE-Routine (10.22.19 @ 04:18) >    Stable bilateral pleural effusion/opacities.    Support devices as above.      < end of copied text >

## 2019-10-22 NOTE — DIETITIAN INITIAL EVALUATION ADULT. - ETIOLOGY
s/p cardiac arrest, s/p intubation s/p cardiac arrest (s/p intubation) & altered GI function 2/2 GI bleed

## 2019-10-22 NOTE — PROGRESS NOTE ADULT - ASSESSMENT
88yo M w/ PMH of HTN and BPH presents to ED s/p cardiac arrest. Pt was sitting and eating dinner at table, suddenly lost consciousness and stopped breathing. Son called EMS, EMS came within few minutes, found asystole on pt, started ACLS. Pt had ROSC after 4 rounds of epi. Pt intubated on the field. As per ED attending, dark bloody stool noted. Hgb 11.4. Repeat CBC shows Hgb 10.       Cardiac arrest  - unknown etiology for now.  - PE can't be r/o. Pt unstable to get CTA chest for now, will get CTA chest when pt is more stable  - no heparin drip for now due to possible GI bleed.   - c/w pressors as needed  - NPO  - IV Fluids increased to 150/hr as patient was fluid responsive.   - neurology consult to r/o anoxic brain injury  Follow with CT head being done today.   Patient with GERRY avoiding CTA chest for now.     Upper GI bleed  - Serial CBCs  - transfuse as needed   - Will hold AC  Hemoglobin stable   Heparin Sub Q for now     Hyperkalemia  - Will repeat BMP, and treat accordingly  - BMP in afternoon and am, f/u lytes    HTN  - hold anti-htn meds for now due to hypotension      DVT prophylaxis: Heparin Sub Q   GI prophylaxis: PPI  Diet: NPO  Code status: DNR 88yo M w/ PMH of HTN and BPH presents to ED s/p cardiac arrest. Pt was sitting and eating dinner at table, suddenly lost consciousness and stopped breathing. Son called EMS, EMS came within few minutes, found asystole on pt, started ACLS. Pt had ROSC after 4 rounds of epi. Pt intubated on the field. As per ED attending, dark bloody stool noted. Hgb 11.4. Repeat CBC shows Hgb 10.       Cardiac arrest  - unknown etiology for now.  - PE can't be r/o. Pt unstable to get CTA chest for now, will get CTA chest when pt is more stable  - no heparin drip for now due to possible GI bleed.   - c/w pressors as needed  - NPO  - IV Fluids increased to 150/hr as patient was fluid responsive.   - neurology consult to r/o anoxic brain injury  Follow with CT head being done today.   Patient with GERRY avoiding CTA chest for now.   Patients family considering Terminal Ween    Upper GI bleed  - Serial CBCs  - transfuse as needed   - Will hold AC  Hemoglobin stable   Heparin Sub Q for now     Hyperkalemia  - Will repeat BMP, and treat accordingly  - BMP in afternoon and am, f/u lytes    HTN  - hold anti-htn meds for now due to hypotension      DVT prophylaxis: Heparin Sub Q   GI prophylaxis: PPI  Diet: NPO  Code status: DNR

## 2019-10-22 NOTE — PROGRESS NOTE ADULT - SUBJECTIVE AND OBJECTIVE BOX
Patient is a 89y old  Male who presents with a chief complaint of cardiac arrest (21 Oct 2019 12:44)      T(F): 95.8 (10-22-19 @ 07:02), Max: 100.2 (10-21-19 @ 11:00)  HR: 75 (10-22-19 @ 07:58)  BP: 112/56 (10-22-19 @ 07:07)  RR: 20 (10-22-19 @ 07:07)  SpO2: 99% (10-22-19 @ 07:58) (93% - 100%)    PHYSICAL EXAM:  GENERAL: NAD, well-groomed, well-developed  HEAD:  Atraumatic, Normocephalic  EYES: EOMI, PERRLA, conjunctiva and sclera clear  ENMT: No tonsillar erythema, exudates, or enlargement; Moist mucous membranes, Good dentition, No lesions  NECK: Supple, No JVD, Normal thyroid  NERVOUS SYSTEM:  Alert & Oriented X3,  Motor Strength 5/5 B/L upper and lower extremities  CHEST/LUNG: Clear to percussion bilaterally; No rales, rhonchi, wheezing, or rubs  HEART: Regular rate and rhythm; No murmurs, rubs, or gallops  ABDOMEN: Soft, Nontender, Nondistended; Bowel sounds present  EXTREMITIES:   No clubbing, cyanosis, or edema  LYMPH: No lymphadenopathy noted  SKIN: No rashes or lesions    labs  10-22    142  |  106  |  68<HH>  ----------------------------<  85  5.6<H>   |  23  |  2.2<H>    Ca    7.6<L>      22 Oct 2019 05:43  Phos  5.3     10-21  Mg     2.2     10-21    TPro  4.3<L>  /  Alb  2.5<L>  /  TBili  0.9  /  DBili  x   /  AST  205<H>  /  ALT  813<H>  /  AlkPhos  52  10-22                          10.4   14.85 )-----------( 158      ( 22 Oct 2019 05:43 )             32.9       PT/INR - ( 20 Oct 2019 18:20 )   PT: 16.20 sec;   INR: 1.41 ratio         PTT - ( 20 Oct 2019 18:20 )  PTT:33.8 sec  Mode: Auto Mode: PRVC/ Volume Support  RR (machine): 20  TV (machine): 500  FiO2: 50  PEEP: 5  MAP: 12  PIP: 20        chlorhexidine 0.12% Liquid 15 milliLiter(s) Oral Mucosa two times a day  chlorhexidine 4% Liquid 1 Application(s) Topical two times a day  clindamycin IVPB      clindamycin IVPB 600 milliGRAM(s) IV Intermittent every 8 hours  fentaNYL   Infusion. 0.5 MICROgram(s)/kG/Hr IV Continuous <Continuous>  influenza   Vaccine 0.5 milliLiter(s) IntraMuscular once  insulin regular  human recombinant. 10 Unit(s) SubCutaneous once  levETIRAcetam  IVPB 250 milliGRAM(s) IV Intermittent every 12 hours  norepinephrine Infusion 0.05 MICROgram(s)/kG/Min IV Continuous <Continuous>  pantoprazole  Injectable 40 milliGRAM(s) IV Push daily  sodium chloride 0.9%. 1000 milliLiter(s) IV Continuous <Continuous>  tamsulosin 0.4 milliGRAM(s) Oral at bedtime

## 2019-10-22 NOTE — DIETITIAN INITIAL EVALUATION ADULT. - ENERGY NEEDS
Estimated energy needs: 1560kcal/d ([id number])  Estimated protein needs: 75-94gm/d (1.2-1.5gm/kg act wt)  Estimated fluid needs: 1ml/1kcal

## 2019-10-22 NOTE — DIETITIAN INITIAL EVALUATION ADULT. - ENTERAL
When appropriate to initiate feeds/it is consistent w/ GOC -  Osmolite 1.5 @30ml/hr and increase to goal of 40ml/hr as tolerated, add Prosource TF 1pktq 12hrs for total of 1500kcal, 82gm protein, 730ml h20. Additional fluids per CCU team.

## 2019-10-22 NOTE — DIETITIAN INITIAL EVALUATION ADULT. - OTHER INFO
Pt is s/p cardiac arrest, unknown etiology. Currently in CCU, intubated and sedated, on 1 pressor. Upper GI bleed. DNR.  Pending neurology consult to r/o anoxic brain injury.

## 2019-10-22 NOTE — PROGRESS NOTE ADULT - SUBJECTIVE AND OBJECTIVE BOX
HPI:  89yo M w/ PMH of HTN and BPH presents to ED s/p cardiac arrest. As per son at bedside, pt had generalized weakness x 1 week. Associated w/ non-productive cough and poor PO intake. No fever noted. Pt was sitting and eating dinner at table, suddenly lost consciousness and stopped breathing. Son called EMS, EMS came within few minutes, found asystole on pt, started ACLS. Pt had ROSC after 4 rounds of epi. Pt intubated on the field. In ED, pt was found to be hypotensive and hypothermic. Central line placed in, pressors started, and received IV abx. As per ED attending, dark bloody stool noted. Hgb 11.4. Repeat CBC shows Hgb 10. (20 Oct 2019 20:15)        INTERVAL HPI/OVERNIGHT EVENTS: no over night events.  Patient intubated and sedated not in any acute distress.   ICU Vital Signs Last 24 Hrs  T(C): 35.4 (22 Oct 2019 07:02), Max: 37.9 (21 Oct 2019 11:00)  T(F): 95.8 (22 Oct 2019 07:02), Max: 100.2 (21 Oct 2019 11:00)  HR: 75 (22 Oct 2019 07:58) (61 - 80)  BP: 112/56 (22 Oct 2019 07:07) (81/58 - 142/75)  BP(mean): 79 (22 Oct 2019 07:07) (63 - 96)  ABP: --  ABP(mean): --  RR: 21 (22 Oct 2019 09:00) (5 - 23)  SpO2: 99% (22 Oct 2019 07:58) (93% - 100%)    I&O's Summary    21 Oct 2019 07:01  -  22 Oct 2019 07:00  --------------------------------------------------------  IN: 4281.5 mL / OUT: 340 mL / NET: 3941.5 mL    22 Oct 2019 07:01  -  22 Oct 2019 10:35  --------------------------------------------------------  IN: 515 mL / OUT: 55 mL / NET: 460 mL      Mode: Auto Mode: PRVC/ Volume Support  RR (machine): 20  TV (machine): 500  FiO2: 50  PEEP: 8  MAP: 12  PIP: 20      LABS:                        10.4   14.85 )-----------( 158      ( 22 Oct 2019 05:43 )             32.9     10-22    142  |  106  |  68<HH>  ----------------------------<  85  5.6<H>   |  23  |  2.2<H>    Ca    7.6<L>      22 Oct 2019 05:43  Phos  5.3     10-21  Mg     2.2     10-21    TPro  4.3<L>  /  Alb  2.5<L>  /  TBili  0.9  /  DBili  x   /  AST  205<H>  /  ALT  813<H>  /  AlkPhos  52  10-22    PT/INR - ( 20 Oct 2019 18:20 )   PT: 16.20 sec;   INR: 1.41 ratio         PTT - ( 20 Oct 2019 18:20 )  PTT:33.8 sec  Urinalysis Basic - ( 21 Oct 2019 12:27 )    Color: Yellow / Appearance: Clear / SG: >=1.030 / pH: x  Gluc: x / Ketone: Trace  / Bili: Small / Urobili: 0.2 mg/dL   Blood: x / Protein: >=300 mg/dL / Nitrite: Negative   Leuk Esterase: Small / RBC: 11-25 /HPF / WBC 26-50 /HPF   Sq Epi: x / Non Sq Epi: Few /HPF / Bacteria: Many      CAPILLARY BLOOD GLUCOSE        ABG - ( 22 Oct 2019 04:45 )  pH, Arterial: 7.37  pH, Blood: x     /  pCO2: 37    /  pO2: 116   / HCO3: 21    / Base Excess: -3.8  /  SaO2: 99                  RADIOLOGY & ADDITIONAL TESTS:    Consultant(s) Notes Reviewed:  [x ] YES  [ ] NO    MEDICATIONS  (STANDING):  chlorhexidine 0.12% Liquid 15 milliLiter(s) Oral Mucosa two times a day  chlorhexidine 4% Liquid 1 Application(s) Topical two times a day  clindamycin IVPB      clindamycin IVPB 600 milliGRAM(s) IV Intermittent every 8 hours  dexmedetomidine Infusion 0.2 MICROgram(s)/kG/Hr (3.115 mL/Hr) IV Continuous <Continuous>  heparin  Injectable 5000 Unit(s) SubCutaneous every 8 hours  influenza   Vaccine 0.5 milliLiter(s) IntraMuscular once  levETIRAcetam  IVPB 250 milliGRAM(s) IV Intermittent every 12 hours  norepinephrine Infusion 0.05 MICROgram(s)/kG/Min (5.113 mL/Hr) IV Continuous <Continuous>  pantoprazole  Injectable 40 milliGRAM(s) IV Push daily  sodium chloride 0.9%. 1000 milliLiter(s) (150 mL/Hr) IV Continuous <Continuous>  tamsulosin 0.4 milliGRAM(s) Oral at bedtime    MEDICATIONS  (PRN):      PHYSICAL EXAM:  GENERAL: patient intubated and sedated.   HEAD:  Atraumatic, Normocephalic  EYES:pupils not reactive to light.   ENT: No tonsillar erythema, exudates, or enlargement; Moist mucous membranes, Good dentition, No lesions  NECK: Supple, No JVD, Normal thyroid, no enlarged nodes  NERVOUS SYSTEM:  patient intubated and sedated.   CHEST/LUNG: B/L good air entry; No rales, rhonchi, or wheezing  HEART: S1S2 normal, no S3, Regular rate and rhythm; No murmurs, rubs, or gallops  ABDOMEN: Soft, Nontender, Nondistended; Bowel sounds present  EXTREMITIES:  2+ Peripheral Pulses, No clubbing, cyanosis, or edema  LYMPH: No lymphadenopathy noted  SKIN: No rashes or lesions    Care Discussed with Consultants/Other Providers [ x] YES  [ ] NO

## 2019-10-23 VITALS — HEART RATE: 57 BPM | RESPIRATION RATE: 15 BRPM | OXYGEN SATURATION: 91 %

## 2019-10-23 LAB
-  AMIKACIN: SIGNIFICANT CHANGE UP
-  AMIKACIN: SIGNIFICANT CHANGE UP
-  AMPICILLIN/SULBACTAM: SIGNIFICANT CHANGE UP
-  AMPICILLIN/SULBACTAM: SIGNIFICANT CHANGE UP
-  AMPICILLIN: SIGNIFICANT CHANGE UP
-  AZTREONAM: SIGNIFICANT CHANGE UP
-  AZTREONAM: SIGNIFICANT CHANGE UP
-  CEFAZOLIN: SIGNIFICANT CHANGE UP
-  CEFAZOLIN: SIGNIFICANT CHANGE UP
-  CEFEPIME: SIGNIFICANT CHANGE UP
-  CEFEPIME: SIGNIFICANT CHANGE UP
-  CEFOXITIN: SIGNIFICANT CHANGE UP
-  CEFOXITIN: SIGNIFICANT CHANGE UP
-  CEFTRIAXONE: SIGNIFICANT CHANGE UP
-  CEFTRIAXONE: SIGNIFICANT CHANGE UP
-  CIPROFLOXACIN: SIGNIFICANT CHANGE UP
-  ERTAPENEM: SIGNIFICANT CHANGE UP
-  GENTAMICIN: SIGNIFICANT CHANGE UP
-  GENTAMICIN: SIGNIFICANT CHANGE UP
-  IMIPENEM: SIGNIFICANT CHANGE UP
-  IMIPENEM: SIGNIFICANT CHANGE UP
-  LEVOFLOXACIN: SIGNIFICANT CHANGE UP
-  MEROPENEM: SIGNIFICANT CHANGE UP
-  MEROPENEM: SIGNIFICANT CHANGE UP
-  NITROFURANTOIN: SIGNIFICANT CHANGE UP
-  PIPERACILLIN/TAZOBACTAM: SIGNIFICANT CHANGE UP
-  PIPERACILLIN/TAZOBACTAM: SIGNIFICANT CHANGE UP
-  TETRACYCLINE: SIGNIFICANT CHANGE UP
-  TIGECYCLINE: SIGNIFICANT CHANGE UP
-  TIGECYCLINE: SIGNIFICANT CHANGE UP
-  TOBRAMYCIN: SIGNIFICANT CHANGE UP
-  TOBRAMYCIN: SIGNIFICANT CHANGE UP
-  TRIMETHOPRIM/SULFAMETHOXAZOLE: SIGNIFICANT CHANGE UP
-  TRIMETHOPRIM/SULFAMETHOXAZOLE: SIGNIFICANT CHANGE UP
-  VANCOMYCIN: SIGNIFICANT CHANGE UP
CULTURE RESULTS: SIGNIFICANT CHANGE UP
METHOD TYPE: SIGNIFICANT CHANGE UP
ORGANISM # SPEC MICROSCOPIC CNT: SIGNIFICANT CHANGE UP
SPECIMEN SOURCE: SIGNIFICANT CHANGE UP

## 2019-10-23 NOTE — DISCHARGE NOTE FOR THE EXPIRED PATIENT - HOSPITAL COURSE
89yo male initially presented to the ER following cardiac arrest at home (while eating). Resuscitation was achieved with intubation following ACLS protocol however patient never regained consciousness. Was maintained on respirator, IV fluids and IV antibiotics without change so on 10/22/2019, request was made by son to liberate patient from Vent. At 0608, with son present patient became asystolic and pronounced. Diagnosis is atherosclerotic heart disease. Autopsy refused

## 2019-11-05 DIAGNOSIS — I46.9 CARDIAC ARREST, CAUSE UNSPECIFIED: ICD-10-CM

## 2019-11-05 DIAGNOSIS — I27.20 PULMONARY HYPERTENSION, UNSPECIFIED: ICD-10-CM

## 2019-11-05 DIAGNOSIS — E87.5 HYPERKALEMIA: ICD-10-CM

## 2019-11-05 DIAGNOSIS — G93.1 ANOXIC BRAIN DAMAGE, NOT ELSEWHERE CLASSIFIED: ICD-10-CM

## 2019-11-05 DIAGNOSIS — I10 ESSENTIAL (PRIMARY) HYPERTENSION: ICD-10-CM

## 2019-11-05 DIAGNOSIS — Z66 DO NOT RESUSCITATE: ICD-10-CM

## 2019-11-05 DIAGNOSIS — I25.10 ATHEROSCLEROTIC HEART DISEASE OF NATIVE CORONARY ARTERY WITHOUT ANGINA PECTORIS: ICD-10-CM

## 2019-11-05 DIAGNOSIS — R68.0 HYPOTHERMIA, NOT ASSOCIATED WITH LOW ENVIRONMENTAL TEMPERATURE: ICD-10-CM

## 2019-11-05 DIAGNOSIS — N40.0 BENIGN PROSTATIC HYPERPLASIA WITHOUT LOWER URINARY TRACT SYMPTOMS: ICD-10-CM

## 2019-11-05 DIAGNOSIS — K29.01 ACUTE GASTRITIS WITH BLEEDING: ICD-10-CM

## 2019-11-05 DIAGNOSIS — J96.00 ACUTE RESPIRATORY FAILURE, UNSPECIFIED WHETHER WITH HYPOXIA OR HYPERCAPNIA: ICD-10-CM

## 2019-11-05 DIAGNOSIS — K55.9 VASCULAR DISORDER OF INTESTINE, UNSPECIFIED: ICD-10-CM

## 2019-11-05 DIAGNOSIS — J69.0 PNEUMONITIS DUE TO INHALATION OF FOOD AND VOMIT: ICD-10-CM

## 2019-11-05 DIAGNOSIS — I26.99 OTHER PULMONARY EMBOLISM WITHOUT ACUTE COR PULMONALE: ICD-10-CM

## 2019-11-05 DIAGNOSIS — Z51.5 ENCOUNTER FOR PALLIATIVE CARE: ICD-10-CM

## 2019-11-05 DIAGNOSIS — N17.9 ACUTE KIDNEY FAILURE, UNSPECIFIED: ICD-10-CM

## 2019-11-05 DIAGNOSIS — I95.9 HYPOTENSION, UNSPECIFIED: ICD-10-CM

## 2019-11-05 DIAGNOSIS — Z88.0 ALLERGY STATUS TO PENICILLIN: ICD-10-CM

## 2019-11-05 DIAGNOSIS — E87.1 HYPO-OSMOLALITY AND HYPONATREMIA: ICD-10-CM

## 2019-11-05 DIAGNOSIS — K72.00 ACUTE AND SUBACUTE HEPATIC FAILURE WITHOUT COMA: ICD-10-CM

## 2019-11-11 ENCOUNTER — APPOINTMENT (OUTPATIENT)
Dept: UROLOGY | Facility: CLINIC | Age: 84
End: 2019-11-11

## 2021-08-27 NOTE — PHYSICAL THERAPY INITIAL EVALUATION ADULT - LEVEL OF CONSCIOUSNESS, REHAB EVAL
alert Complex Repair And Dorsal Nasal Flap Text: The defect edges were debeveled with a #15 scalpel blade.  The primary defect was closed partially with a complex linear closure.  Given the location of the remaining defect, shape of the defect and the proximity to free margins a dorsal nasal flap was deemed most appropriate for complete closure of the defect.  Using a sterile surgical marker, an appropriate flap was drawn incorporating the defect and placing the expected incisions within the relaxed skin tension lines where possible.    The area thus outlined was incised deep to adipose tissue with a #15 scalpel blade.  The skin margins were undermined to an appropriate distance in all directions utilizing iris scissors.
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
## 2022-05-03 NOTE — PROGRESS NOTE ADULT - PROBLEM SELECTOR PLAN 1
s/p suprapubic catheter placed by urology  continue flomax  monitor urine output  d/c ochoa today as per urology  f/u with Dr. Sheppard in 2-3 weeks  discussed with surgery/urology PA
*  s/p Placement of suprapubic catheter and Villa catheter due to urinary retention from BPH (POD #1):  - Continue with regular Villa x 24 hours, then can remove and TOV.    - Continue suprapubic catheter x 6 weeks and then f/u with Dr. Sheppard as outpatient for possible removal.    - continue to monitor SP and Villa output and color.  - F/U final urine culture results.  - Continue Cipro. 200 mg q24h (renal adjusted dose due to elevated creatinine level).   - case d/w Dr. Sheppard and agrees with plan.
* s/p Placement of Suprapubic cath. and Ochoa Cath. today in O.R. by Dr. Sheppard.   - Continue to monitor urine output q shift from both suprapubic and ochoa catheters.  - If clots develop and/or catheters stop draining, then may start CBI.   - Pain medications PRN.  - Continue Flomax.  - GI / VTE prophylaxis.  - Continue present medical mgt. per medical team.  - Case d/w Dr. Sheppard and he states ---> * Ochoa catheter to stay in place x 48 hours, then can TOV.   * Suprapubic catheter to stay in place x 6 weeks and can be re-assessed as outpatient.
s/p suprapubic catheter placed by urology  continue flomax  monitor urine output  keep ochoa cath for 48 hours as per urology  f/u with Dr. Sheppard
s/p suprapubic catheter placed by urology  continue flomax  monitor urine output  ochoa d/c'ed  f/u with Dr. Sheppard in 2-3 weeks  discussed with surgery/urology PA
hour(s)

## 2023-01-09 NOTE — BRIEF OPERATIVE NOTE - PRE-OP DX
Quality 47: Advance Care Plan: Advance Care Planning discussed and documented; advance care plan or surrogate decision maker documented in the medical record. Quality 110: Preventive Care And Screening: Influenza Immunization: Influenza Immunization Administered during Influenza season Quality 431: Preventive Care And Screening: Unhealthy Alcohol Use - Screening: Patient not identified as an unhealthy alcohol user when screened for unhealthy alcohol use using a systematic screening method Quality 130: Documentation Of Current Medications In The Medical Record: Current Medications Documented Detail Level: Detailed Quality 111:Pneumonia Vaccination Status For Older Adults: Pneumococcal vaccine (PPSV23) administered on or after patient’s 60th birthday and before the end of the measurement period Quality 226: Preventive Care And Screening: Tobacco Use: Screening And Cessation Intervention: Patient screened for tobacco use and is an ex/non-smoker Urinary retention due to benign prostatic hyperplasia  03/24/2018    Active  Anabel Sheppard

## 2023-11-15 NOTE — ED PROCEDURE NOTE - CPROC ED SPECIMEN OBTAINED1
Called patient's father. Reports that Opzelura worked really good while patient was using it, cured eczema by 70-80%.  However, patient reported it made his face sting and he stopped using it.  The eczema has flared a bit recently, and they have been using the fluocinolone cream that was prescribed and feels that works better with no side effects.      Patient is not interested in starting Dupixent, and wants to hold off on using Opzelura again due to side effects.  Wants to discuss everything at next appointment that is booked  In February; reports eczema is stable and not bothersome so declined offer to move up appt.     Re-auth can be closed at this time.        Opzelura 1.5% cream - Updated Chart Notes Needed     The current Prior Authorization for this medication  on 23. The insurance is requiring updated chart notes to approve the medication. Chart notes must indicate the patient is experiencing a positive clinical response to the medication.     Please advise when updated chart notes are available and how you would like us to proceed.     Lisandra Melchor   Clinical Pharmacy Liaison   11/15/23    blood

## 2024-06-24 NOTE — PATIENT PROFILE ADULT. - NS PRO AD NO ADVANCE DIRECTIVE
Chart reviewed and patient discussed in IDR. From home, independent. PCP and insurance. No needs identified at this time. CM following.   
No
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
